# Patient Record
Sex: FEMALE | Race: BLACK OR AFRICAN AMERICAN | Employment: FULL TIME | URBAN - METROPOLITAN AREA
[De-identification: names, ages, dates, MRNs, and addresses within clinical notes are randomized per-mention and may not be internally consistent; named-entity substitution may affect disease eponyms.]

---

## 2017-01-12 RX ORDER — HYDROCHLOROTHIAZIDE 25 MG/1
TABLET ORAL
Qty: 14 TABLET | Refills: 0 | Status: SHIPPED | OUTPATIENT
Start: 2017-01-12 | End: 2017-01-17

## 2017-01-12 RX ORDER — METOPROLOL TARTRATE 50 MG/1
TABLET, FILM COATED ORAL
Qty: 28 TABLET | Refills: 0 | Status: SHIPPED | OUTPATIENT
Start: 2017-01-12 | End: 2017-01-17

## 2017-01-13 NOTE — TELEPHONE ENCOUNTER
Pt contacted. Informed MD approved 2 week supply until f/u appt is made. Appt scheduled at this time. No further actions required.

## 2017-01-17 ENCOUNTER — LAB ENCOUNTER (OUTPATIENT)
Dept: LAB | Age: 37
End: 2017-01-17
Attending: FAMILY MEDICINE
Payer: COMMERCIAL

## 2017-01-17 ENCOUNTER — OFFICE VISIT (OUTPATIENT)
Dept: FAMILY MEDICINE CLINIC | Facility: CLINIC | Age: 37
End: 2017-01-17

## 2017-01-17 VITALS
SYSTOLIC BLOOD PRESSURE: 136 MMHG | BODY MASS INDEX: 30 KG/M2 | WEIGHT: 173.63 LBS | HEART RATE: 78 BPM | DIASTOLIC BLOOD PRESSURE: 92 MMHG

## 2017-01-17 DIAGNOSIS — N94.6 MENSTRUAL CRAMPS: ICD-10-CM

## 2017-01-17 DIAGNOSIS — I10 ESSENTIAL HYPERTENSION: ICD-10-CM

## 2017-01-17 DIAGNOSIS — I10 ESSENTIAL HYPERTENSION: Primary | ICD-10-CM

## 2017-01-17 LAB
ALBUMIN SERPL BCP-MCNC: 3.7 G/DL (ref 3.5–4.8)
ALBUMIN/GLOB SERPL: 1 {RATIO} (ref 1–2)
ALP SERPL-CCNC: 50 U/L (ref 32–100)
ALT SERPL-CCNC: 11 U/L (ref 14–54)
ANION GAP SERPL CALC-SCNC: 9 MMOL/L (ref 0–18)
AST SERPL-CCNC: 21 U/L (ref 15–41)
BASOPHILS # BLD: 0.1 K/UL (ref 0–0.2)
BASOPHILS NFR BLD: 2 %
BILIRUB SERPL-MCNC: 0.5 MG/DL (ref 0.3–1.2)
BUN SERPL-MCNC: 8 MG/DL (ref 8–20)
BUN/CREAT SERPL: 8.8 (ref 10–20)
CALCIUM SERPL-MCNC: 8.7 MG/DL (ref 8.5–10.5)
CHLORIDE SERPL-SCNC: 103 MMOL/L (ref 95–110)
CHOLEST SERPL-MCNC: 149 MG/DL (ref 110–200)
CO2 SERPL-SCNC: 30 MMOL/L (ref 22–32)
CREAT SERPL-MCNC: 0.91 MG/DL (ref 0.5–1.5)
EOSINOPHIL # BLD: 0.1 K/UL (ref 0–0.7)
EOSINOPHIL NFR BLD: 1 %
ERYTHROCYTE [DISTWIDTH] IN BLOOD BY AUTOMATED COUNT: 17.8 % (ref 11–15)
GLOBULIN PLAS-MCNC: 3.7 G/DL (ref 2.5–3.7)
GLUCOSE SERPL-MCNC: 87 MG/DL (ref 70–99)
HBA1C MFR BLD: 5.6 % (ref 4–6)
HCT VFR BLD AUTO: 36.9 % (ref 35–48)
HDLC SERPL-MCNC: 41 MG/DL
HGB BLD-MCNC: 11.6 G/DL (ref 12–16)
LDLC SERPL CALC-MCNC: 96 MG/DL (ref 0–99)
LYMPHOCYTES # BLD: 2.5 K/UL (ref 1–4)
LYMPHOCYTES NFR BLD: 43 %
MCH RBC QN AUTO: 21 PG (ref 27–32)
MCHC RBC AUTO-ENTMCNC: 31.5 G/DL (ref 32–37)
MCV RBC AUTO: 66.6 FL (ref 80–100)
MONOCYTES # BLD: 0.3 K/UL (ref 0–1)
MONOCYTES NFR BLD: 6 %
NEUTROPHILS # BLD AUTO: 2.7 K/UL (ref 1.8–7.7)
NEUTROPHILS NFR BLD: 48 %
NONHDLC SERPL-MCNC: 108 MG/DL
OSMOLALITY UR CALC.SUM OF ELEC: 292 MOSM/KG (ref 275–295)
PLATELET # BLD AUTO: 271 K/UL (ref 140–400)
PMV BLD AUTO: 10 FL (ref 7.4–10.3)
POTASSIUM SERPL-SCNC: 3.3 MMOL/L (ref 3.3–5.1)
PROT SERPL-MCNC: 7.4 G/DL (ref 5.9–8.4)
RBC # BLD AUTO: 5.54 M/UL (ref 3.7–5.4)
SODIUM SERPL-SCNC: 142 MMOL/L (ref 136–144)
TRIGL SERPL-MCNC: 58 MG/DL (ref 1–149)
TSH SERPL-ACNC: 1.01 UIU/ML (ref 0.34–5.6)
WBC # BLD AUTO: 5.7 K/UL (ref 4–11)

## 2017-01-17 PROCEDURE — 83036 HEMOGLOBIN GLYCOSYLATED A1C: CPT

## 2017-01-17 PROCEDURE — 99212 OFFICE O/P EST SF 10 MIN: CPT | Performed by: FAMILY MEDICINE

## 2017-01-17 PROCEDURE — 36415 COLL VENOUS BLD VENIPUNCTURE: CPT

## 2017-01-17 PROCEDURE — 84443 ASSAY THYROID STIM HORMONE: CPT

## 2017-01-17 PROCEDURE — 85025 COMPLETE CBC W/AUTO DIFF WBC: CPT

## 2017-01-17 PROCEDURE — 80061 LIPID PANEL: CPT

## 2017-01-17 PROCEDURE — 99214 OFFICE O/P EST MOD 30 MIN: CPT | Performed by: FAMILY MEDICINE

## 2017-01-17 PROCEDURE — 80053 COMPREHEN METABOLIC PANEL: CPT

## 2017-01-17 RX ORDER — METOPROLOL TARTRATE 50 MG/1
50 TABLET, FILM COATED ORAL 2 TIMES DAILY
Qty: 180 TABLET | Refills: 1 | Status: SHIPPED | OUTPATIENT
Start: 2017-01-17 | End: 2017-01-31 | Stop reason: ALTCHOICE

## 2017-01-17 RX ORDER — HYDROCHLOROTHIAZIDE 25 MG/1
25 TABLET ORAL
Qty: 90 TABLET | Refills: 1 | Status: SHIPPED | OUTPATIENT
Start: 2017-01-17 | End: 2017-03-22 | Stop reason: ALTCHOICE

## 2017-01-17 RX ORDER — NAPROXEN 500 MG/1
500 TABLET ORAL 2 TIMES DAILY WITH MEALS
Qty: 60 TABLET | Refills: 0 | Status: SHIPPED | OUTPATIENT
Start: 2017-01-17 | End: 2017-02-16

## 2017-01-17 NOTE — PROGRESS NOTES
HPI:    Shan Moore is a 39year old female presents to clinic for follow up, has not been seen in clinic in over 2 years. Has HTN. Currently taking HCTZ and Metoprolol. Reports compliance with meds.  Patient denies HAs, blurry vision, nausea, vom Outpatient Prescriptions:  hydrochlorothiazide 25 MG Oral Tab Take 1 tablet (25 mg total) by mouth once daily. Disp: 90 tablet Rfl: 1   Metoprolol Tartrate 50 MG Oral Tab Take 1 tablet (50 mg total) by mouth 2 (two) times daily.  Disp: 180 tablet Rfl: 1   n encounter diagnosis)  - meds refilled  - CBC, CMP, TSH, Lipid Panel, and A1C to lab  - Advised lifestyle modifications including 30-40 minutes of cardio exercise 4-5 times a week - apart from walking to work, low salt/low fat, low carb diet, and weight los

## 2017-01-25 ENCOUNTER — TELEPHONE (OUTPATIENT)
Dept: FAMILY MEDICINE CLINIC | Facility: CLINIC | Age: 37
End: 2017-01-25

## 2017-01-25 DIAGNOSIS — D64.9 LOW HEMOGLOBIN: Primary | ICD-10-CM

## 2017-01-25 NOTE — TELEPHONE ENCOUNTER
Spoke to patient today regarding results. Normal CMP, TSH, Lipid Panel, and A1C. CBC shows low hgb with low HCT. Iron profile and Vitamin B12 orders placed . Will follow up results.

## 2017-01-31 ENCOUNTER — OFFICE VISIT (OUTPATIENT)
Dept: FAMILY MEDICINE CLINIC | Facility: CLINIC | Age: 37
End: 2017-01-31

## 2017-01-31 ENCOUNTER — APPOINTMENT (OUTPATIENT)
Dept: LAB | Age: 37
End: 2017-01-31
Attending: FAMILY MEDICINE
Payer: COMMERCIAL

## 2017-01-31 VITALS
TEMPERATURE: 99 F | DIASTOLIC BLOOD PRESSURE: 133 MMHG | HEART RATE: 72 BPM | WEIGHT: 174.38 LBS | BODY MASS INDEX: 30 KG/M2 | SYSTOLIC BLOOD PRESSURE: 189 MMHG

## 2017-01-31 DIAGNOSIS — I10 ESSENTIAL HYPERTENSION: ICD-10-CM

## 2017-01-31 DIAGNOSIS — D64.9 LOW HEMOGLOBIN: ICD-10-CM

## 2017-01-31 DIAGNOSIS — N94.6 MENSTRUAL CRAMPS: Primary | ICD-10-CM

## 2017-01-31 LAB
FERRITIN SERPL IA-MCNC: 19 NG/ML (ref 11–307)
IRON SATN MFR SERPL: 20 % (ref 15–50)
IRON SERPL-MCNC: 83 MCG/DL (ref 28–170)
TIBC SERPL-MCNC: 416 MCG/DL (ref 228–428)
TRANSFERRIN SERPL-MCNC: 315 MG/DL (ref 192–382)
VIT B12 SERPL-MCNC: 229 PG/ML (ref 181–914)

## 2017-01-31 PROCEDURE — 36415 COLL VENOUS BLD VENIPUNCTURE: CPT

## 2017-01-31 PROCEDURE — 99212 OFFICE O/P EST SF 10 MIN: CPT | Performed by: FAMILY MEDICINE

## 2017-01-31 PROCEDURE — 83540 ASSAY OF IRON: CPT

## 2017-01-31 PROCEDURE — 99214 OFFICE O/P EST MOD 30 MIN: CPT | Performed by: FAMILY MEDICINE

## 2017-01-31 PROCEDURE — 82607 VITAMIN B-12: CPT

## 2017-01-31 PROCEDURE — 84466 ASSAY OF TRANSFERRIN: CPT

## 2017-01-31 PROCEDURE — 82728 ASSAY OF FERRITIN: CPT

## 2017-01-31 RX ORDER — METOPROLOL SUCCINATE 50 MG/1
50 TABLET, EXTENDED RELEASE ORAL DAILY
Qty: 30 TABLET | Refills: 1 | Status: SHIPPED | OUTPATIENT
Start: 2017-01-31 | End: 2017-04-11

## 2017-01-31 NOTE — PROGRESS NOTES
HPI:    Suzi Benites is a 39year old female presents to clinic with concerns of menstrual cramps. Patient states that per period started yesterday and cramps have been very severe.  Says that she did try taking Naproxen 500 mg BID 2-3 days before cy Oral Tab Take 1 tablet (25 mg total) by mouth once daily. Disp: 90 tablet Rfl: 1   naproxen 500 MG Oral Tab Take 1 tablet (500 mg total) by mouth 2 (two) times daily with meals. Disp: 60 tablet Rfl: 0   IBUPROFEN & CAFFEINE-VITAMINS OR Take  by mouth.  Disp rechecked manually, 170/90 mmHg, patient asymptomatic  - Metoprolol Tartrate stopped and started Metoprolol Succinate ER 50 mg daily  - to continue HCTZ 25 mg   - to follow up in 2 weeks or sooner PRN       Orders This Visit:  No orders of the defined type

## 2017-03-22 ENCOUNTER — OFFICE VISIT (OUTPATIENT)
Dept: FAMILY MEDICINE CLINIC | Facility: CLINIC | Age: 37
End: 2017-03-22

## 2017-03-22 VITALS
DIASTOLIC BLOOD PRESSURE: 113 MMHG | SYSTOLIC BLOOD PRESSURE: 169 MMHG | HEART RATE: 89 BPM | WEIGHT: 178.19 LBS | BODY MASS INDEX: 31 KG/M2

## 2017-03-22 DIAGNOSIS — I10 UNCONTROLLED HYPERTENSION: ICD-10-CM

## 2017-03-22 DIAGNOSIS — Z30.09 GENERAL COUNSELING AND ADVICE FOR CONTRACEPTIVE MANAGEMENT: ICD-10-CM

## 2017-03-22 DIAGNOSIS — N92.0 MENORRHAGIA WITH REGULAR CYCLE: Primary | ICD-10-CM

## 2017-03-22 PROCEDURE — 93005 ELECTROCARDIOGRAM TRACING: CPT | Performed by: FAMILY MEDICINE

## 2017-03-22 PROCEDURE — 99212 OFFICE O/P EST SF 10 MIN: CPT | Performed by: FAMILY MEDICINE

## 2017-03-22 PROCEDURE — 99214 OFFICE O/P EST MOD 30 MIN: CPT | Performed by: FAMILY MEDICINE

## 2017-03-22 PROCEDURE — 93010 ELECTROCARDIOGRAM REPORT: CPT | Performed by: FAMILY MEDICINE

## 2017-03-22 RX ORDER — LISINOPRIL AND HYDROCHLOROTHIAZIDE 25; 20 MG/1; MG/1
1 TABLET ORAL DAILY
Qty: 30 TABLET | Refills: 1 | Status: SHIPPED | OUTPATIENT
Start: 2017-03-22 | End: 2017-05-30

## 2017-03-22 NOTE — PROGRESS NOTES
HPI:    Nai Lover is a 39year old female presents to clinic with questions regarding IUD insertion. States that she is tired of her heavy periods and crmaps.  She has tried to premedicate with Naproxen and this helps but not as much as she'd like Comment:Other reaction(s): Nausea  Amoxicillin                 Comment:Other reaction(s): Diarrhea  Potassium                   Comment:Other reaction(s): Diarrhea      ROS:   Review of Systems   Constitutional: Negative. HENT: Negative.     Eyes: Nhi Haddad 30 tablet 1      Sig: Take 1 tablet by mouth daily. Imaging & Referrals:  ELECTROCARDIOGRAM, COMPLETE     Patient verbalized understanding. No barriers to learning observed.    3/22/2017  Niko Garcia MD

## 2017-04-12 RX ORDER — METOPROLOL SUCCINATE 50 MG/1
TABLET, EXTENDED RELEASE ORAL
Qty: 30 TABLET | Refills: 2 | Status: SHIPPED | OUTPATIENT
Start: 2017-04-12 | End: 2017-07-08

## 2017-04-12 NOTE — TELEPHONE ENCOUNTER
METOPROLOL Refill protocol passed because the patient met the following protocol for ANTIHYPERTENSIVES Medication refilled for 90 days per protocol.   Hypertensive Medications  Protocol Criteria:  · Appointment scheduled in the past 6 months or in the next

## 2017-05-30 ENCOUNTER — HOSPITAL ENCOUNTER (EMERGENCY)
Facility: HOSPITAL | Age: 37
Discharge: HOME | End: 2017-05-30
Attending: EMERGENCY MEDICINE
Payer: COMMERCIAL

## 2017-05-30 ENCOUNTER — APPOINTMENT (OUTPATIENT)
Dept: ULTRASOUND IMAGING | Facility: HOSPITAL | Age: 37
End: 2017-05-30
Attending: EMERGENCY MEDICINE
Payer: COMMERCIAL

## 2017-05-30 ENCOUNTER — TELEPHONE (OUTPATIENT)
Dept: FAMILY MEDICINE CLINIC | Facility: CLINIC | Age: 37
End: 2017-05-30

## 2017-05-30 VITALS
TEMPERATURE: 98 F | HEIGHT: 65 IN | DIASTOLIC BLOOD PRESSURE: 88 MMHG | HEART RATE: 62 BPM | WEIGHT: 165 LBS | BODY MASS INDEX: 27.49 KG/M2 | SYSTOLIC BLOOD PRESSURE: 128 MMHG | OXYGEN SATURATION: 99 % | RESPIRATION RATE: 18 BRPM

## 2017-05-30 DIAGNOSIS — R10.9 ABDOMINAL PAIN, ACUTE: Primary | ICD-10-CM

## 2017-05-30 DIAGNOSIS — N94.6 DYSMENORRHEA: ICD-10-CM

## 2017-05-30 PROCEDURE — 81001 URINALYSIS AUTO W/SCOPE: CPT | Performed by: EMERGENCY MEDICINE

## 2017-05-30 PROCEDURE — 80048 BASIC METABOLIC PNL TOTAL CA: CPT | Performed by: EMERGENCY MEDICINE

## 2017-05-30 PROCEDURE — 87086 URINE CULTURE/COLONY COUNT: CPT | Performed by: EMERGENCY MEDICINE

## 2017-05-30 PROCEDURE — 87106 FUNGI IDENTIFICATION YEAST: CPT | Performed by: EMERGENCY MEDICINE

## 2017-05-30 PROCEDURE — 87591 N.GONORRHOEAE DNA AMP PROB: CPT | Performed by: EMERGENCY MEDICINE

## 2017-05-30 PROCEDURE — 76856 US EXAM PELVIC COMPLETE: CPT | Performed by: EMERGENCY MEDICINE

## 2017-05-30 PROCEDURE — 81025 URINE PREGNANCY TEST: CPT

## 2017-05-30 PROCEDURE — 96361 HYDRATE IV INFUSION ADD-ON: CPT

## 2017-05-30 PROCEDURE — 96375 TX/PRO/DX INJ NEW DRUG ADDON: CPT

## 2017-05-30 PROCEDURE — 85025 COMPLETE CBC W/AUTO DIFF WBC: CPT | Performed by: EMERGENCY MEDICINE

## 2017-05-30 PROCEDURE — 93975 VASCULAR STUDY: CPT | Performed by: EMERGENCY MEDICINE

## 2017-05-30 PROCEDURE — 96374 THER/PROPH/DIAG INJ IV PUSH: CPT

## 2017-05-30 PROCEDURE — 87491 CHLMYD TRACH DNA AMP PROBE: CPT | Performed by: EMERGENCY MEDICINE

## 2017-05-30 PROCEDURE — 85060 BLOOD SMEAR INTERPRETATION: CPT | Performed by: EMERGENCY MEDICINE

## 2017-05-30 PROCEDURE — 87808 TRICHOMONAS ASSAY W/OPTIC: CPT | Performed by: EMERGENCY MEDICINE

## 2017-05-30 PROCEDURE — 87205 SMEAR GRAM STAIN: CPT | Performed by: EMERGENCY MEDICINE

## 2017-05-30 PROCEDURE — 76830 TRANSVAGINAL US NON-OB: CPT | Performed by: EMERGENCY MEDICINE

## 2017-05-30 PROCEDURE — 99284 EMERGENCY DEPT VISIT MOD MDM: CPT

## 2017-05-30 RX ORDER — ONDANSETRON 2 MG/ML
4 INJECTION INTRAMUSCULAR; INTRAVENOUS ONCE
Status: COMPLETED | OUTPATIENT
Start: 2017-05-30 | End: 2017-05-30

## 2017-05-30 RX ORDER — HYDROCODONE BITARTRATE AND ACETAMINOPHEN 5; 325 MG/1; MG/1
1-2 TABLET ORAL EVERY 4 HOURS PRN
Qty: 15 TABLET | Refills: 0 | Status: SHIPPED | OUTPATIENT
Start: 2017-05-30 | End: 2017-06-01

## 2017-05-30 RX ORDER — HYDROMORPHONE HYDROCHLORIDE 1 MG/ML
0.5 INJECTION, SOLUTION INTRAMUSCULAR; INTRAVENOUS; SUBCUTANEOUS ONCE
Status: COMPLETED | OUTPATIENT
Start: 2017-05-30 | End: 2017-05-30

## 2017-05-30 RX ORDER — SODIUM CHLORIDE 9 MG/ML
INJECTION, SOLUTION INTRAVENOUS ONCE
Status: COMPLETED | OUTPATIENT
Start: 2017-05-30 | End: 2017-05-30

## 2017-05-30 RX ORDER — NITROFURANTOIN MACROCRYSTALS 100 MG/1
100 CAPSULE ORAL EVERY 12 HOURS
Qty: 10 CAPSULE | Refills: 0 | Status: ON HOLD | OUTPATIENT
Start: 2017-05-30 | End: 2017-08-19

## 2017-05-30 NOTE — TELEPHONE ENCOUNTER
Dr Lucy Walton, patient is hoping to get IUD inserted within the next few days. She went to ER today, pain 9/10, dx with dysmenorrhea, had US and negative pregnancy test. Received IV morphine and zofran.  Discharged with rx for hydrocodone  She is on day 2 of

## 2017-05-30 NOTE — TELEPHONE ENCOUNTER
Pt stts she was seen in the er today 5/30 about cramping   Pt stts she needs to have an IUD inserted and already had the consult. Pt has some more questions about the IUD. Before making the appt.   Please advise

## 2017-05-30 NOTE — ED NOTES
Pt discharged home in good condition with family, pt denies any pain no vomiting pt verbalizes understanding of discharge instructions

## 2017-05-30 NOTE — ED NOTES
Pt back from [address], pelvic exam done by Dr Damaris Frazier pt has not had any vomiting since arrival

## 2017-05-30 NOTE — ED NOTES
Pt states abd pain is level 9 per pain scale, IV .9ns one liter bolus started labs drawn and sent pregnancy test negative medications given as ordered, urine obtained and sent

## 2017-05-30 NOTE — ED PROVIDER NOTES
Patient Seen in: [facility] Emergency Department    History   Patient presents with:  Nausea/Vomiting/Diarrhea (gastrointestinal)    Stated Complaint: N/V/    HPI    Patient presents with lower abdominal pain.   She reports that she has had this ty Hypertension Father    • Hypertension Mother    • Genito-Urinary Disorder Mother      fibroids   • Diabetes Maternal Grandmother          Smoking Status: Former Smoker                   Packs/Day: 1.00  Years:         Smokeless Status: Former User Zofran IV fluids check blood tests urinalysis urine pregnancy    The urinalysis showed white blood cells but no bacteria.   Culture is pending I recommend covering with antibiotics being the result we did do vaginal exam which was normal except for some blo Clinical Impression:  Abdominal pain, acute  (primary encounter diagnosis)  Dysmenorrhea    Disposition:  Discharge    [facility] [phone]    In 2 days  For re-check    Evelio Cassidy

## 2017-05-31 RX ORDER — LISINOPRIL AND HYDROCHLOROTHIAZIDE 25; 20 MG/1; MG/1
TABLET ORAL
Qty: 30 TABLET | Refills: 1 | Status: SHIPPED | OUTPATIENT
Start: 2017-05-31 | End: 2017-07-08

## 2017-06-01 ENCOUNTER — OFFICE VISIT (OUTPATIENT)
Dept: FAMILY MEDICINE CLINIC | Facility: CLINIC | Age: 37
End: 2017-06-01

## 2017-06-01 VITALS — SYSTOLIC BLOOD PRESSURE: 148 MMHG | HEART RATE: 71 BPM | DIASTOLIC BLOOD PRESSURE: 99 MMHG | TEMPERATURE: 98 F

## 2017-06-01 DIAGNOSIS — Z30.430 ENCOUNTER FOR INSERTION OF INTRAUTERINE CONTRACEPTIVE DEVICE: Primary | ICD-10-CM

## 2017-06-01 DIAGNOSIS — H93.8X3 CLOGGED EAR, BILATERAL: ICD-10-CM

## 2017-06-01 PROCEDURE — 58300 INSERT INTRAUTERINE DEVICE: CPT | Performed by: FAMILY MEDICINE

## 2017-06-01 PROCEDURE — 69210 REMOVE IMPACTED EAR WAX UNI: CPT | Performed by: FAMILY MEDICINE

## 2017-06-01 PROCEDURE — 99213 OFFICE O/P EST LOW 20 MIN: CPT | Performed by: FAMILY MEDICINE

## 2017-06-01 NOTE — PROGRESS NOTES
Maya Hernadez is a 40year old female presents to clinic for IUD insert. States that she has very heavy cycles with severe cramping. Nulliparous. Last Pap - normal   Also, patient states that she cannot hear properly from both of her ears.  This has (36.9 °C)   TempSrc: Oral     Physical Exam   Constitutional: She is well-developed, well-nourished, and in no distress. HENT:   Head: Normocephalic and atraumatic. Unable to visualize TMs bilaterally 2/2 cerumen impaction   Neurological: She is alert.

## 2017-07-08 ENCOUNTER — OFFICE VISIT (OUTPATIENT)
Dept: FAMILY MEDICINE CLINIC | Facility: CLINIC | Age: 37
End: 2017-07-08

## 2017-07-08 ENCOUNTER — HOSPITAL ENCOUNTER (OUTPATIENT)
Dept: GENERAL RADIOLOGY | Age: 37
Discharge: HOME | End: 2017-07-08
Attending: FAMILY MEDICINE
Payer: COMMERCIAL

## 2017-07-08 VITALS
SYSTOLIC BLOOD PRESSURE: 128 MMHG | DIASTOLIC BLOOD PRESSURE: 82 MMHG | WEIGHT: 175 LBS | RESPIRATION RATE: 16 BRPM | TEMPERATURE: 98 F | BODY MASS INDEX: 29 KG/M2 | HEART RATE: 50 BPM

## 2017-07-08 DIAGNOSIS — I10 ESSENTIAL HYPERTENSION: ICD-10-CM

## 2017-07-08 DIAGNOSIS — Z30.431 IUD CHECK UP: Primary | ICD-10-CM

## 2017-07-08 DIAGNOSIS — Z30.431 IUD CHECK UP: ICD-10-CM

## 2017-07-08 PROCEDURE — 99212 OFFICE O/P EST SF 10 MIN: CPT | Performed by: FAMILY MEDICINE

## 2017-07-08 PROCEDURE — 99214 OFFICE O/P EST MOD 30 MIN: CPT | Performed by: FAMILY MEDICINE

## 2017-07-08 PROCEDURE — 72170 X-RAY EXAM OF PELVIS: CPT | Performed by: FAMILY MEDICINE

## 2017-07-08 RX ORDER — METOPROLOL SUCCINATE 50 MG/1
TABLET, EXTENDED RELEASE ORAL
Qty: 90 TABLET | Refills: 1 | Status: SHIPPED | OUTPATIENT
Start: 2017-07-08 | End: 2018-02-15

## 2017-07-08 RX ORDER — LISINOPRIL AND HYDROCHLOROTHIAZIDE 25; 20 MG/1; MG/1
1 TABLET ORAL
Qty: 90 TABLET | Refills: 1 | Status: SHIPPED | OUTPATIENT
Start: 2017-07-08 | End: 2018-02-15

## 2017-07-10 ENCOUNTER — TELEPHONE (OUTPATIENT)
Dept: FAMILY MEDICINE CLINIC | Facility: CLINIC | Age: 37
End: 2017-07-10

## 2017-07-10 NOTE — TELEPHONE ENCOUNTER
Pt calling regarding her results of X-ray done at the [address] [place] [address]. Danielle Jones please advise

## 2017-07-11 NOTE — TELEPHONE ENCOUNTER
Please inform patient that IUD is in the uterus, if spotting doesn't stop in the next 6-8 weeks, to let me know

## 2017-07-12 NOTE — TELEPHONE ENCOUNTER
Pt returned call, verified name and . Reviewed Xray results and recommendations with pt per doctor's instructions. Pt agreed with plan of care and had no further questions at this time.

## 2017-08-09 ENCOUNTER — TELEPHONE (OUTPATIENT)
Dept: ADMINISTRATIVE | Age: 37
End: 2017-08-09

## 2017-08-09 NOTE — TELEPHONE ENCOUNTER
Good afternoon Dr. Zuleta Blue form pending in POONAM. Pt is requesting 1-4 days a month of intermittent time with episodes lasting 1-48 hours for her bad periods. Do you approve? Please advise.     Thank you,  Higinio Rosario

## 2017-08-18 ENCOUNTER — HOSPITAL ENCOUNTER (INPATIENT)
Facility: HOSPITAL | Age: 37
LOS: 1 days | Discharge: HOME | DRG: 310 | End: 2017-08-20
Attending: EMERGENCY MEDICINE | Admitting: HOSPITALIST
Payer: COMMERCIAL

## 2017-08-18 DIAGNOSIS — K52.9 GASTROENTERITIS: Primary | ICD-10-CM

## 2017-08-18 DIAGNOSIS — I48.91 ATRIAL FIBRILLATION WITH RAPID VENTRICULAR RESPONSE (HCC): ICD-10-CM

## 2017-08-18 DIAGNOSIS — R77.8 ELEVATED TROPONIN: ICD-10-CM

## 2017-08-19 ENCOUNTER — APPOINTMENT (OUTPATIENT)
Dept: NUCLEAR MEDICINE | Facility: HOSPITAL | Age: 37
DRG: 310 | End: 2017-08-19
Attending: HOSPITALIST
Payer: COMMERCIAL

## 2017-08-19 ENCOUNTER — APPOINTMENT (OUTPATIENT)
Dept: CT IMAGING | Facility: HOSPITAL | Age: 37
DRG: 310 | End: 2017-08-19
Attending: EMERGENCY MEDICINE
Payer: COMMERCIAL

## 2017-08-19 ENCOUNTER — APPOINTMENT (OUTPATIENT)
Dept: CV DIAGNOSTICS | Facility: HOSPITAL | Age: 37
DRG: 310 | End: 2017-08-19
Attending: HOSPITALIST
Payer: COMMERCIAL

## 2017-08-19 PROBLEM — I48.91 ATRIAL FIBRILLATION WITH RAPID VENTRICULAR RESPONSE (HCC): Status: ACTIVE | Noted: 2017-08-19

## 2017-08-19 PROBLEM — K52.9 GASTROENTERITIS: Status: ACTIVE | Noted: 2017-08-19

## 2017-08-19 LAB
ALBUMIN SERPL BCP-MCNC: 4.2 G/DL (ref 3.5–4.8)
ALBUMIN/GLOB SERPL: 1.1 {RATIO} (ref 1–2)
ALP SERPL-CCNC: 57 U/L (ref 32–100)
ALT SERPL-CCNC: 16 U/L (ref 14–54)
ANION GAP SERPL CALC-SCNC: 10 MMOL/L (ref 0–18)
APTT PPP: 27.8 SECONDS (ref 23.2–35.3)
APTT PPP: 70.7 SECONDS (ref 23.2–35.3)
AST SERPL-CCNC: 29 U/L (ref 15–41)
B-HCG UR QL: NEGATIVE
BASOPHILS # BLD: 0.1 K/UL (ref 0–0.2)
BASOPHILS NFR BLD: 1 %
BILIRUB SERPL-MCNC: 1 MG/DL (ref 0.3–1.2)
BILIRUB UR QL: NEGATIVE
BUN SERPL-MCNC: 17 MG/DL (ref 8–20)
BUN/CREAT SERPL: 14.9 (ref 10–20)
CALCIUM SERPL-MCNC: 9 MG/DL (ref 8.5–10.5)
CHLORIDE SERPL-SCNC: 105 MMOL/L (ref 95–110)
CHOLEST SERPL-MCNC: 152 MG/DL (ref 110–200)
CO2 SERPL-SCNC: 24 MMOL/L (ref 22–32)
COLOR UR: YELLOW
CREAT SERPL-MCNC: 1.14 MG/DL (ref 0.5–1.5)
D DIMER PPP FEU-MCNC: 1.76 MCG/ML (ref ?–0.5)
EOSINOPHIL # BLD: 0 K/UL (ref 0–0.7)
EOSINOPHIL NFR BLD: 0 %
ERYTHROCYTE [DISTWIDTH] IN BLOOD BY AUTOMATED COUNT: 15.5 % (ref 11–15)
GLOBULIN PLAS-MCNC: 4 G/DL (ref 2.5–3.7)
GLUCOSE SERPL-MCNC: 129 MG/DL (ref 70–99)
GLUCOSE UR-MCNC: NEGATIVE MG/DL
HCT VFR BLD AUTO: 41.2 % (ref 35–48)
HDLC SERPL-MCNC: 41 MG/DL
HGB BLD-MCNC: 13.2 G/DL (ref 12–16)
KETONES UR-MCNC: 20 MG/DL
LDLC SERPL CALC-MCNC: 98 MG/DL (ref 0–99)
LEUKOCYTE ESTERASE UR QL STRIP.AUTO: NEGATIVE
LIPASE SERPL-CCNC: 14 U/L (ref 22–51)
LYMPHOCYTES # BLD: 1.3 K/UL (ref 1–4)
LYMPHOCYTES NFR BLD: 10 %
MAGNESIUM SERPL-MCNC: 1.9 MG/DL (ref 1.8–2.5)
MCH RBC QN AUTO: 22.5 PG (ref 27–32)
MCHC RBC AUTO-ENTMCNC: 32 G/DL (ref 32–37)
MCV RBC AUTO: 70.3 FL (ref 80–100)
MONOCYTES # BLD: 0.2 K/UL (ref 0–1)
MONOCYTES NFR BLD: 2 %
NEUTROPHILS # BLD AUTO: 11.1 K/UL (ref 1.8–7.7)
NEUTROPHILS NFR BLD: 88 %
NITRITE UR QL STRIP.AUTO: NEGATIVE
NONHDLC SERPL-MCNC: 111 MG/DL
OSMOLALITY UR CALC.SUM OF ELEC: 291 MOSM/KG (ref 275–295)
PH UR: 5 [PH] (ref 5–8)
PLATELET # BLD AUTO: 263 K/UL (ref 140–400)
PMV BLD AUTO: 9.5 FL (ref 7.4–10.3)
POTASSIUM SERPL-SCNC: 3.3 MMOL/L (ref 3.3–5.1)
PROT SERPL-MCNC: 8.2 G/DL (ref 5.9–8.4)
PROT UR-MCNC: 100 MG/DL
RBC # BLD AUTO: 5.86 M/UL (ref 3.7–5.4)
RBC #/AREA URNS AUTO: 4 /HPF
SODIUM SERPL-SCNC: 139 MMOL/L (ref 136–144)
SP GR UR STRIP: 1.03 (ref 1–1.03)
TRIGL SERPL-MCNC: 65 MG/DL (ref 1–149)
TROPONIN I SERPL-MCNC: 0.53 NG/ML (ref ?–0.03)
TROPONIN I SERPL-MCNC: 0.54 NG/ML (ref ?–0.03)
TROPONIN I SERPL-MCNC: 0.6 NG/ML (ref ?–0.03)
TSH SERPL-ACNC: 0.71 UIU/ML (ref 0.45–5.33)
UROBILINOGEN UR STRIP-ACNC: <2
VIT C UR-MCNC: 40 MG/DL
WBC # BLD AUTO: 12.7 K/UL (ref 4–11)
WBC #/AREA URNS AUTO: 2 /HPF

## 2017-08-19 PROCEDURE — 99223 1ST HOSP IP/OBS HIGH 75: CPT | Performed by: HOSPITALIST

## 2017-08-19 PROCEDURE — 93306 TTE W/DOPPLER COMPLETE: CPT | Performed by: HOSPITALIST

## 2017-08-19 PROCEDURE — 78582 LUNG VENTILAT&PERFUS IMAGING: CPT | Performed by: HOSPITALIST

## 2017-08-19 PROCEDURE — 74177 CT ABD & PELVIS W/CONTRAST: CPT | Performed by: EMERGENCY MEDICINE

## 2017-08-19 RX ORDER — FAMOTIDINE 10 MG/ML
20 INJECTION, SOLUTION INTRAVENOUS ONCE
Status: COMPLETED | OUTPATIENT
Start: 2017-08-19 | End: 2017-08-19

## 2017-08-19 RX ORDER — HEPARIN SODIUM AND DEXTROSE 10000; 5 [USP'U]/100ML; G/100ML
INJECTION INTRAVENOUS CONTINUOUS
Status: DISCONTINUED | OUTPATIENT
Start: 2017-08-19 | End: 2017-08-20

## 2017-08-19 RX ORDER — ASPIRIN 81 MG/1
TABLET, CHEWABLE ORAL
Status: COMPLETED
Start: 2017-08-19 | End: 2017-08-19

## 2017-08-19 RX ORDER — ACETAMINOPHEN 325 MG/1
650 TABLET ORAL EVERY 6 HOURS PRN
Status: DISCONTINUED | OUTPATIENT
Start: 2017-08-19 | End: 2017-08-20

## 2017-08-19 RX ORDER — SODIUM CHLORIDE AND POTASSIUM CHLORIDE .9; .15 G/100ML; G/100ML
SOLUTION INTRAVENOUS CONTINUOUS
Status: DISCONTINUED | OUTPATIENT
Start: 2017-08-19 | End: 2017-08-20

## 2017-08-19 RX ORDER — METOCLOPRAMIDE HYDROCHLORIDE 5 MG/ML
10 INJECTION INTRAMUSCULAR; INTRAVENOUS EVERY 6 HOURS PRN
Status: DISCONTINUED | OUTPATIENT
Start: 2017-08-19 | End: 2017-08-20

## 2017-08-19 RX ORDER — METOPROLOL SUCCINATE 50 MG/1
50 TABLET, EXTENDED RELEASE ORAL
Status: DISCONTINUED | OUTPATIENT
Start: 2017-08-19 | End: 2017-08-20

## 2017-08-19 RX ORDER — ASPIRIN 81 MG/1
324 TABLET, CHEWABLE ORAL ONCE
Status: COMPLETED | OUTPATIENT
Start: 2017-08-19 | End: 2017-08-19

## 2017-08-19 RX ORDER — ONDANSETRON 2 MG/ML
4 INJECTION INTRAMUSCULAR; INTRAVENOUS ONCE
Status: COMPLETED | OUTPATIENT
Start: 2017-08-19 | End: 2017-08-19

## 2017-08-19 RX ORDER — SODIUM CHLORIDE 9 MG/ML
INJECTION, SOLUTION INTRAVENOUS CONTINUOUS
Status: ACTIVE | OUTPATIENT
Start: 2017-08-19 | End: 2017-08-19

## 2017-08-19 RX ORDER — PANTOPRAZOLE SODIUM 40 MG/1
40 TABLET, DELAYED RELEASE ORAL
Status: DISCONTINUED | OUTPATIENT
Start: 2017-08-19 | End: 2017-08-20

## 2017-08-19 RX ORDER — MAGNESIUM HYDROXIDE/ALUMINUM HYDROXICE/SIMETHICONE 120; 1200; 1200 MG/30ML; MG/30ML; MG/30ML
30 SUSPENSION ORAL 4 TIMES DAILY PRN
Status: DISCONTINUED | OUTPATIENT
Start: 2017-08-19 | End: 2017-08-20

## 2017-08-19 RX ORDER — DILTIAZEM HYDROCHLORIDE 5 MG/ML
15 INJECTION INTRAVENOUS ONCE
Status: COMPLETED | OUTPATIENT
Start: 2017-08-19 | End: 2017-08-19

## 2017-08-19 RX ORDER — ONDANSETRON 2 MG/ML
4 INJECTION INTRAMUSCULAR; INTRAVENOUS EVERY 6 HOURS PRN
Status: DISCONTINUED | OUTPATIENT
Start: 2017-08-19 | End: 2017-08-20

## 2017-08-19 RX ORDER — HEPARIN SODIUM AND DEXTROSE 10000; 5 [USP'U]/100ML; G/100ML
12 INJECTION INTRAVENOUS ONCE
Status: COMPLETED | OUTPATIENT
Start: 2017-08-19 | End: 2017-08-19

## 2017-08-19 RX ORDER — MORPHINE SULFATE 2 MG/ML
2 INJECTION, SOLUTION INTRAMUSCULAR; INTRAVENOUS EVERY 2 HOUR PRN
Status: DISCONTINUED | OUTPATIENT
Start: 2017-08-19 | End: 2017-08-20

## 2017-08-19 RX ORDER — MORPHINE SULFATE 4 MG/ML
4 INJECTION, SOLUTION INTRAMUSCULAR; INTRAVENOUS EVERY 2 HOUR PRN
Status: DISCONTINUED | OUTPATIENT
Start: 2017-08-19 | End: 2017-08-20

## 2017-08-19 RX ORDER — HEPARIN SODIUM 1000 [USP'U]/ML
60 INJECTION, SOLUTION INTRAVENOUS; SUBCUTANEOUS ONCE
Status: COMPLETED | OUTPATIENT
Start: 2017-08-19 | End: 2017-08-19

## 2017-08-19 NOTE — ED PROVIDER NOTES
Patient Seen in: [facility] AND [facility] Emergency Department    History   Patient presents with:  Nausea/Vomiting/Diarrhea (gastrointestinal)    Stated Complaint:     HPI    39 y/o female w/ no abd surgeries and c/o diffuse abd pain, N/V/D, chills and decreas Triage Vitals  BP: (!) 138/108 [08/18/17 2354]  Pulse: 71 [08/18/17 2353]  Resp: 18 [08/18/17 2353]  Temp: 98.1 °F (36.7 °C) [08/18/17 2353]  Temp src: Temporal [08/18/17 2353]  SpO2: 99 % [08/18/17 2353]  O2 Device: None (Room air) [08/18/17 2353]    Curr within normal limits   TROPONIN I, 0 HOUR - Abnormal; Notable for the following:     Troponin 0.54 (*)     All other components within normal limits   TROPONIN I, 2 HOUR - Abnormal; Notable for the following:     Troponin 0.53 (*)     All other components Dr. Cesilia Hciks in the ED on 3:25 AM ET. Nirali Wiley M.D. This report has been electronically signed and verified by the Radiologist whose name is printed above.     DD:  08/19/2017/DT:  08/19/2017    MDM   She was some improvement in her symptoms here as

## 2017-08-19 NOTE — H&P
[address] Patient Status:  Inpatient    1980 MRN [id number]   Location [facility] 3W/SW Attending Yariel Kendrick MD   Hosp Day # 0 PCP Alberto Hudson MD     Date:  2017  Date has quit using smokeless tobacco. She reports that she drinks alcohol. She reports that she does not use drugs.     Allergies:    Amlodipine                  Comment:Other reaction(s): Nausea  Amoxicillin                 Comment:Other reaction(s): Diarrhea flank, non-distended, hyperactive bowel sounds, no organomegaly. Lymphatics:  No lymphadenopathy neck, axilla, groin. Musculoskeletal: Normal range of motion. normal strength. Feet:  Normal pulses.   Neurologic:  Alert, oriented, no focal deficits, cran well.    Prophylaxis  Subcutaneous heparin    CODE STATUS  Full    Primary care physician  Tucker Joyner MD    Disposition  Clinical course will dictate outcome      Nicolle Barnett MD  8/19/2017  5:04 AM

## 2017-08-19 NOTE — PROGRESS NOTES
30 min spent post mn  Troponin high no stress  Await echo ?  If needs cath  dw mwh   vq neg dw pt no alcohol fatty liver, iud. diverticulosis

## 2017-08-19 NOTE — CONSULTS
[facility]  Report of Consultation    Olivia Carreon Patient Status:  Inpatient    1980 MRN [id number]   Location [facility] 3W/SW Attending Estelle Dunn MD   Hosp Day # 0 PCP Ney Saunders MD     Reason for Consultation:   Af mg/hr, Intravenous, Continuous  •  0.9%  NaCl infusion, , Intravenous, Continuous  •  heparin (PORCINE) 07020fcpyv/250mL infusion ED INITIAL DOSE, 12 Units/kg/hr, Intravenous, Once  •  heparin (PORCINE) drip 76399edpwf/250mL infusion CONTINUOUS, 200-3,000 Results  Component Value Date   HGB 13.2 08/19/2017   HCT 41.2 08/19/2017    08/19/2017               Assessment/Plan:    1.  Afib - unknown duration; pt does not sense her afib; rates now controlled with CCB drip    -ECHO   -TSH   -CHADs score one:

## 2017-08-20 VITALS
OXYGEN SATURATION: 98 % | DIASTOLIC BLOOD PRESSURE: 88 MMHG | SYSTOLIC BLOOD PRESSURE: 137 MMHG | BODY MASS INDEX: 29.73 KG/M2 | HEIGHT: 65 IN | RESPIRATION RATE: 16 BRPM | WEIGHT: 178.44 LBS | TEMPERATURE: 98 F | HEART RATE: 78 BPM

## 2017-08-20 LAB
ANION GAP SERPL CALC-SCNC: 9 MMOL/L (ref 0–18)
APTT PPP: 31.2 SECONDS (ref 23.2–35.3)
APTT PPP: 49.9 SECONDS (ref 23.2–35.3)
BASOPHILS # BLD: 0.1 K/UL (ref 0–0.2)
BASOPHILS NFR BLD: 1 %
BUN SERPL-MCNC: 7 MG/DL (ref 8–20)
BUN/CREAT SERPL: 10.4 (ref 10–20)
CALCIUM SERPL-MCNC: 8.1 MG/DL (ref 8.5–10.5)
CHLORIDE SERPL-SCNC: 110 MMOL/L (ref 95–110)
CO2 SERPL-SCNC: 20 MMOL/L (ref 22–32)
CREAT SERPL-MCNC: 0.67 MG/DL (ref 0.5–1.5)
EOSINOPHIL # BLD: 0 K/UL (ref 0–0.7)
EOSINOPHIL NFR BLD: 0 %
ERYTHROCYTE [DISTWIDTH] IN BLOOD BY AUTOMATED COUNT: 15.7 % (ref 11–15)
GLUCOSE SERPL-MCNC: 94 MG/DL (ref 70–99)
HCT VFR BLD AUTO: 35.7 % (ref 35–48)
HGB BLD-MCNC: 11.5 G/DL (ref 12–16)
LYMPHOCYTES # BLD: 3.2 K/UL (ref 1–4)
LYMPHOCYTES NFR BLD: 36 %
MAGNESIUM SERPL-MCNC: 1.9 MG/DL (ref 1.8–2.5)
MCH RBC QN AUTO: 22.4 PG (ref 27–32)
MCHC RBC AUTO-ENTMCNC: 32.2 G/DL (ref 32–37)
MCV RBC AUTO: 69.5 FL (ref 80–100)
MONOCYTES # BLD: 0.5 K/UL (ref 0–1)
MONOCYTES NFR BLD: 6 %
NEUTROPHILS # BLD AUTO: 5.1 K/UL (ref 1.8–7.7)
NEUTROPHILS NFR BLD: 57 %
OSMOLALITY UR CALC.SUM OF ELEC: 286 MOSM/KG (ref 275–295)
PLATELET # BLD AUTO: 217 K/UL (ref 140–400)
PMV BLD AUTO: 9.2 FL (ref 7.4–10.3)
POTASSIUM SERPL-SCNC: 4.1 MMOL/L (ref 3.3–5.1)
POTASSIUM SERPL-SCNC: 4.1 MMOL/L (ref 3.3–5.1)
RBC # BLD AUTO: 5.14 M/UL (ref 3.7–5.4)
SODIUM SERPL-SCNC: 139 MMOL/L (ref 136–144)
WBC # BLD AUTO: 9 K/UL (ref 4–11)

## 2017-08-20 PROCEDURE — 99239 HOSP IP/OBS DSCHRG MGMT >30: CPT | Performed by: HOSPITALIST

## 2017-08-20 RX ORDER — HEPARIN SODIUM 1000 [USP'U]/ML
30 INJECTION, SOLUTION INTRAVENOUS; SUBCUTANEOUS ONCE
Status: COMPLETED | OUTPATIENT
Start: 2017-08-20 | End: 2017-08-20

## 2017-08-20 RX ORDER — 0.9 % SODIUM CHLORIDE 0.9 %
VIAL (ML) INJECTION
Status: COMPLETED
Start: 2017-08-20 | End: 2017-08-20

## 2017-08-20 RX ORDER — LISINOPRIL AND HYDROCHLOROTHIAZIDE 25; 20 MG/1; MG/1
1 TABLET ORAL
Status: DISCONTINUED | OUTPATIENT
Start: 2017-08-20 | End: 2017-08-20 | Stop reason: RX

## 2017-08-20 RX ORDER — ASPIRIN 325 MG
325 TABLET ORAL DAILY
Qty: 30 TABLET | Refills: 0 | Status: SHIPPED | OUTPATIENT
Start: 2017-08-20 | End: 2018-10-13

## 2017-08-20 NOTE — PLAN OF CARE
Patient ok for discharge home today per cardiology and primary care. Patient started on a daily aspirin. Patient instructed to follow-up with cardiology- call on Monday to make an appointment.

## 2017-08-20 NOTE — DISCHARGE SUMMARY
[facility]  Discharge Summary     [facility]  [facility] 1980    Status: Inpatient  Day #: 1    Attending: Yojana Cast MD  PCP: Isiah Amador MD     Date of Admission: 2017  Date of Discharge: 2017     Hospital Discharge Diag Quantity:  30 tablet  Refills:  0        CONTINUE taking these medications      Instructions Prescription details   Lisinopril-Hydrochlorothiazide 20-25 MG Tabs      Take 1 tablet by mouth once daily.    Quantity:  90 tablet  Refills:  1     Metoprolol Succ

## 2017-08-20 NOTE — PROGRESS NOTES
[facility]  Progress Note     Jyothi Romero  : 1980    Status: Inpatient  Day #: 1    Attending: Kevan Conrad MD  PCP: Tam Bertrand MD      Assessment and Plan     New onset atrial fibrillation with RVR  - converted to NSR  - con --    --    --   >60   CA  9.0   --    --    --   8.1*   ALB  4.2   --    --    --    --    NA  139   --    --    --   139   K  3.3   --    --    --   4.1  4.1   CL  105   --    --    --   110   CO2  24   --    --    --   20*   GLU  129*   --    --    -- -------------------------- Atrial fibrillation -Nonspecific ST depression + Nonspecific T-abnormality -Nondiagnostic.  ABNORMAL When compared with ECG of 08/19/2017 01:18:10 No significant changes have occurred Electronically signed on 08/19/2017 at 11:20 b

## 2017-08-20 NOTE — PLAN OF CARE
Problem: Patient/Family Goals  Goal: Patient/Family Short Term Goal  Patient's Short Term Goal: Decreased/absent nausea and vomiting     Interventions:   - Reglan/Zofran available PRN  - No reports of N/V at this time  - See additional Care Plan goals for

## 2017-08-20 NOTE — PROGRESS NOTES
[facility]  Progress Note    Jyothi Romero Patient Status:  Inpatient    1980 MRN [id number]   Location [facility] 3W/SW Attending James Mcnulty MD   [facility] Day # 1 PCP Tam Bertrand MD     Subjective:  N/V resolved.   Converted to NS Can stop heparin. CHADS2 score 0, Rx aspirin for stoke prophylaxis. OK for discharge home today from cardiac standpoint. Follow up Dr Fernandez Medina or Dr Nadja Romeo in office after discharge. Thanks.     Amna Rudolph  8/20/2017  4:39 PM

## 2017-08-20 NOTE — PLAN OF CARE
CARDIOVASCULAR - ADULT    • Maintains optimal cardiac output and hemodynamic stability Progressing    • Absence of cardiac arrhythmias or at baseline Progressing        Patient/Family Goals    • Patient/Family Long Term Goal: Remain out of hospital  Progre

## 2017-08-21 ENCOUNTER — TELEPHONE (OUTPATIENT)
Dept: INTERNAL MEDICINE UNIT | Facility: HOSPITAL | Age: 37
End: 2017-08-21

## 2017-08-21 NOTE — TELEPHONE ENCOUNTER
Pt discharged from [facility] on 8/20/17 . Please call to schedule follow up with Primary Care Physician.    Thanks

## 2017-08-22 ENCOUNTER — TELEPHONE (OUTPATIENT)
Dept: FAMILY MEDICINE CLINIC | Facility: CLINIC | Age: 37
End: 2017-08-22

## 2017-08-22 NOTE — TELEPHONE ENCOUNTER
Patient was in the ER  - wants to know if there's any way she can follow up with you today regarding the issue (this is about her heart) or some time tomorrow. Please advise.

## 2017-08-23 ENCOUNTER — OFFICE VISIT (OUTPATIENT)
Dept: FAMILY MEDICINE CLINIC | Facility: CLINIC | Age: 37
End: 2017-08-23

## 2017-08-23 VITALS
SYSTOLIC BLOOD PRESSURE: 131 MMHG | WEIGHT: 179.19 LBS | TEMPERATURE: 98 F | BODY MASS INDEX: 30 KG/M2 | DIASTOLIC BLOOD PRESSURE: 86 MMHG | RESPIRATION RATE: 14 BRPM | HEART RATE: 90 BPM

## 2017-08-23 DIAGNOSIS — I48.91 ATRIAL FIBRILLATION, UNSPECIFIED TYPE (HCC): Primary | ICD-10-CM

## 2017-08-23 PROCEDURE — 99212 OFFICE O/P EST SF 10 MIN: CPT | Performed by: FAMILY MEDICINE

## 2017-08-23 PROCEDURE — 99214 OFFICE O/P EST MOD 30 MIN: CPT | Performed by: FAMILY MEDICINE

## 2017-08-23 NOTE — PROGRESS NOTES
HPI:    Dariana Marquez is a 40year old female presents to clinic for hospital follow up. Last Friday, patient drinks 1 small bottle of sangria and woke up in the middle of the night vomiting and with abdominal pain.  She went to the ER and was told sh Disp: 90 tablet Rfl: 1   Metoprolol Succinate ER 50 MG Oral Tablet 24 Hr TAKE 1 TABLET (50 MG TOTAL) BY MOUTH DAILY.  Disp: 90 tablet Rfl: 1       Allergies:    Amlodipine                  Comment:Other reaction(s): Nausea  Amoxicillin                 Comme more than 12.5 minutes of counseling. Orders This Visit:  No orders of the defined types were placed in this encounter.       Meds This Visit:    No prescriptions requested or ordered in this encounter    Imaging & Referrals:  CARDIO - INTERNAL

## 2017-09-07 ENCOUNTER — PRIOR ORIGINAL RECORDS (OUTPATIENT)
Dept: OTHER | Age: 37
End: 2017-09-07

## 2017-09-07 ENCOUNTER — MYAURORA ACCOUNT LINK (OUTPATIENT)
Dept: OTHER | Age: 37
End: 2017-09-07

## 2017-09-13 ENCOUNTER — HOSPITAL ENCOUNTER (OUTPATIENT)
Dept: CV DIAGNOSTICS | Facility: HOSPITAL | Age: 37
Discharge: HOME | End: 2017-09-13
Attending: INTERNAL MEDICINE
Payer: COMMERCIAL

## 2017-09-13 DIAGNOSIS — I48.0 PAF (PAROXYSMAL ATRIAL FIBRILLATION) (HCC): ICD-10-CM

## 2017-09-13 DIAGNOSIS — I10 HTN (HYPERTENSION): ICD-10-CM

## 2017-09-13 PROCEDURE — 93017 CV STRESS TEST TRACING ONLY: CPT | Performed by: INTERNAL MEDICINE

## 2017-09-13 PROCEDURE — 93350 STRESS TTE ONLY: CPT | Performed by: INTERNAL MEDICINE

## 2017-09-13 PROCEDURE — 93016 CV STRESS TEST SUPVJ ONLY: CPT | Performed by: INTERNAL MEDICINE

## 2017-09-13 PROCEDURE — 93018 CV STRESS TEST I&R ONLY: CPT | Performed by: INTERNAL MEDICINE

## 2017-09-13 NOTE — IMAGING NOTE
Patient here for outpatient stress echo, ordered by Dr. Key Lopez. Resting ECG with T wave inversion inferolateral leads. Patient asymptomatic. I reviewed resting ECG with Dr. Key Lopez. Orders received to proceed with test as planned.    Nonah Gottron RN

## 2017-12-06 ENCOUNTER — OFFICE VISIT (OUTPATIENT)
Dept: FAMILY MEDICINE CLINIC | Facility: CLINIC | Age: 37
End: 2017-12-06

## 2017-12-06 VITALS
SYSTOLIC BLOOD PRESSURE: 151 MMHG | DIASTOLIC BLOOD PRESSURE: 101 MMHG | RESPIRATION RATE: 14 BRPM | HEART RATE: 74 BPM | TEMPERATURE: 98 F

## 2017-12-06 DIAGNOSIS — F41.9 ANXIETY: ICD-10-CM

## 2017-12-06 DIAGNOSIS — I10 ESSENTIAL HYPERTENSION: ICD-10-CM

## 2017-12-06 DIAGNOSIS — N92.0 MENSTRUAL SPOTTING: Primary | ICD-10-CM

## 2017-12-06 PROCEDURE — 99214 OFFICE O/P EST MOD 30 MIN: CPT | Performed by: FAMILY MEDICINE

## 2017-12-06 PROCEDURE — 99212 OFFICE O/P EST SF 10 MIN: CPT | Performed by: FAMILY MEDICINE

## 2017-12-06 RX ORDER — CITALOPRAM 10 MG/1
10 TABLET ORAL DAILY
Qty: 30 TABLET | Refills: 0 | Status: SHIPPED | OUTPATIENT
Start: 2017-12-06 | End: 2018-05-07

## 2017-12-11 NOTE — PROGRESS NOTES
HPI:    Consuelo Ricepshire is a 40year old female presents to clinic for follow up regarding IUD. States that cramps have improved and flow has improved, much lighter.  However, patient feels that periods are longer, states she bleeds for 3-4 days and the Yes              Comment: occasionally       Medications (Active prior to today's visit):    Current Outpatient Prescriptions:  Citalopram Hydrobromide 10 MG Oral Tab Take 1 tablet (10 mg total) by mouth daily.  Disp: 30 tablet Rfl: 0   Lisinopril-Hydrochlo effect    Essential hypertension  - BP elevated, asymptomatic  - daily compliance with meds strongly encouraged. To set a reminder on her phone to remember to take meds.    - to follow up in 2 weeks     Anxiety  - Citalopram 10 mg to pharmacy   - to follow

## 2018-02-15 RX ORDER — METOPROLOL SUCCINATE 50 MG/1
TABLET, EXTENDED RELEASE ORAL
Qty: 90 TABLET | Refills: 1 | Status: SHIPPED | OUTPATIENT
Start: 2018-02-15 | End: 2018-10-13

## 2018-02-15 RX ORDER — LISINOPRIL AND HYDROCHLOROTHIAZIDE 25; 20 MG/1; MG/1
TABLET ORAL
Qty: 90 TABLET | Refills: 1 | Status: SHIPPED | OUTPATIENT
Start: 2018-02-15 | End: 2018-10-13

## 2018-05-07 ENCOUNTER — OFFICE VISIT (OUTPATIENT)
Dept: FAMILY MEDICINE CLINIC | Facility: CLINIC | Age: 38
End: 2018-05-07

## 2018-05-07 VITALS
RESPIRATION RATE: 20 BRPM | TEMPERATURE: 98 F | HEIGHT: 65 IN | HEART RATE: 85 BPM | WEIGHT: 178 LBS | DIASTOLIC BLOOD PRESSURE: 86 MMHG | SYSTOLIC BLOOD PRESSURE: 120 MMHG | BODY MASS INDEX: 29.66 KG/M2

## 2018-05-07 DIAGNOSIS — J06.9 ACUTE URI: ICD-10-CM

## 2018-05-07 PROCEDURE — 99213 OFFICE O/P EST LOW 20 MIN: CPT | Performed by: FAMILY MEDICINE

## 2018-05-07 PROCEDURE — 99212 OFFICE O/P EST SF 10 MIN: CPT | Performed by: FAMILY MEDICINE

## 2018-05-07 RX ORDER — AZITHROMYCIN 250 MG/1
TABLET, FILM COATED ORAL
Qty: 6 TABLET | Refills: 0 | Status: SHIPPED | OUTPATIENT
Start: 2018-05-07 | End: 2018-09-04

## 2018-05-07 NOTE — PROGRESS NOTES
HPI:    Patient ID: Trevor Lynch is a 45year old female. Pt presents with allergy /cold symptoms for several days. Pt has had cough, sore throat. Had fevers. Pt has tried otc remedies without relief. Pt states no sick contacts.            Review of worse or not better; Follow up in one week if not resolved or as needed if worse. Note for work provided        No orders of the defined types were placed in this encounter.       Meds This Visit:  Signed Prescriptions Disp Refills    azithromycin (Henry Kwon

## 2018-05-10 ENCOUNTER — TELEPHONE (OUTPATIENT)
Dept: FAMILY MEDICINE CLINIC | Facility: CLINIC | Age: 38
End: 2018-05-10

## 2018-08-28 ENCOUNTER — TELEPHONE (OUTPATIENT)
Dept: ADMINISTRATIVE | Age: 38
End: 2018-08-28

## 2018-08-28 NOTE — TELEPHONE ENCOUNTER
POONAM packet emailed to pt ('[email or web address]', '[email or web address]') per pc from pt. FMLA form received from [address] [facility] via fax. Logged for processing. Email sent to pt - current appt is needed, pt last seen in December.  NK

## 2018-09-04 ENCOUNTER — LAB ENCOUNTER (OUTPATIENT)
Dept: LAB | Age: 38
End: 2018-09-04
Attending: FAMILY MEDICINE
Payer: COMMERCIAL

## 2018-09-04 ENCOUNTER — OFFICE VISIT (OUTPATIENT)
Dept: FAMILY MEDICINE CLINIC | Facility: CLINIC | Age: 38
End: 2018-09-04
Payer: COMMERCIAL

## 2018-09-04 VITALS
DIASTOLIC BLOOD PRESSURE: 119 MMHG | HEIGHT: 65 IN | BODY MASS INDEX: 30.55 KG/M2 | WEIGHT: 183.38 LBS | SYSTOLIC BLOOD PRESSURE: 154 MMHG | TEMPERATURE: 99 F | HEART RATE: 80 BPM

## 2018-09-04 DIAGNOSIS — I10 ESSENTIAL HYPERTENSION: ICD-10-CM

## 2018-09-04 DIAGNOSIS — N94.6 DYSMENORRHEA: ICD-10-CM

## 2018-09-04 DIAGNOSIS — N92.0 MENORRHAGIA WITH REGULAR CYCLE: ICD-10-CM

## 2018-09-04 DIAGNOSIS — I10 ESSENTIAL HYPERTENSION: Primary | ICD-10-CM

## 2018-09-04 DIAGNOSIS — F41.9 ANXIETY: ICD-10-CM

## 2018-09-04 LAB
ALBUMIN SERPL BCP-MCNC: 3.8 G/DL (ref 3.5–4.8)
ALBUMIN/GLOB SERPL: 1.1 {RATIO} (ref 1–2)
ALP SERPL-CCNC: 53 U/L (ref 32–100)
ALT SERPL-CCNC: 15 U/L (ref 14–54)
ANION GAP SERPL CALC-SCNC: 6 MMOL/L (ref 0–18)
AST SERPL-CCNC: 21 U/L (ref 15–41)
BASOPHILS # BLD: 0 K/UL (ref 0–0.2)
BASOPHILS NFR BLD: 0 %
BILIRUB SERPL-MCNC: 0.2 MG/DL (ref 0.3–1.2)
BUN SERPL-MCNC: 12 MG/DL (ref 8–20)
BUN/CREAT SERPL: 12.2 (ref 10–20)
CALCIUM SERPL-MCNC: 8.9 MG/DL (ref 8.5–10.5)
CHLORIDE SERPL-SCNC: 106 MMOL/L (ref 95–110)
CHOLEST SERPL-MCNC: 133 MG/DL (ref 110–200)
CO2 SERPL-SCNC: 27 MMOL/L (ref 22–32)
CREAT SERPL-MCNC: 0.98 MG/DL (ref 0.5–1.5)
EOSINOPHIL # BLD: 0.1 K/UL (ref 0–0.7)
EOSINOPHIL NFR BLD: 1 %
ERYTHROCYTE [DISTWIDTH] IN BLOOD BY AUTOMATED COUNT: 14.9 % (ref 11–15)
GLOBULIN PLAS-MCNC: 3.5 G/DL (ref 2.5–3.7)
GLUCOSE SERPL-MCNC: 91 MG/DL (ref 70–99)
HCT VFR BLD AUTO: 40.9 % (ref 35–48)
HDLC SERPL-MCNC: 32 MG/DL
HGB BLD-MCNC: 12.8 G/DL (ref 12–16)
LDLC SERPL CALC-MCNC: 78 MG/DL (ref 0–99)
LYMPHOCYTES # BLD: 2.6 K/UL (ref 1–4)
LYMPHOCYTES NFR BLD: 46 %
MCH RBC QN AUTO: 22.1 PG (ref 27–32)
MCHC RBC AUTO-ENTMCNC: 31.3 G/DL (ref 32–37)
MCV RBC AUTO: 70.8 FL (ref 80–100)
MONOCYTES # BLD: 0.4 K/UL (ref 0–1)
MONOCYTES NFR BLD: 8 %
NEUTROPHILS # BLD AUTO: 2.6 K/UL (ref 1.8–7.7)
NEUTROPHILS NFR BLD: 46 %
NONHDLC SERPL-MCNC: 101 MG/DL
OSMOLALITY UR CALC.SUM OF ELEC: 287 MOSM/KG (ref 275–295)
PATIENT FASTING: NO
PLATELET # BLD AUTO: 245 K/UL (ref 140–400)
PMV BLD AUTO: 9.4 FL (ref 7.4–10.3)
POTASSIUM SERPL-SCNC: 3.2 MMOL/L (ref 3.3–5.1)
PROT SERPL-MCNC: 7.3 G/DL (ref 5.9–8.4)
RBC # BLD AUTO: 5.78 M/UL (ref 3.7–5.4)
SODIUM SERPL-SCNC: 139 MMOL/L (ref 136–144)
TRIGL SERPL-MCNC: 117 MG/DL (ref 1–149)
TSH SERPL-ACNC: 2.23 UIU/ML (ref 0.45–5.33)
WBC # BLD AUTO: 5.7 K/UL (ref 4–11)

## 2018-09-04 PROCEDURE — 99212 OFFICE O/P EST SF 10 MIN: CPT | Performed by: FAMILY MEDICINE

## 2018-09-04 PROCEDURE — 99214 OFFICE O/P EST MOD 30 MIN: CPT | Performed by: FAMILY MEDICINE

## 2018-09-04 PROCEDURE — 80053 COMPREHEN METABOLIC PANEL: CPT

## 2018-09-04 PROCEDURE — 36415 COLL VENOUS BLD VENIPUNCTURE: CPT

## 2018-09-04 PROCEDURE — 85025 COMPLETE CBC W/AUTO DIFF WBC: CPT

## 2018-09-04 PROCEDURE — 80061 LIPID PANEL: CPT

## 2018-09-04 PROCEDURE — 84443 ASSAY THYROID STIM HORMONE: CPT

## 2018-09-04 NOTE — PROGRESS NOTES
HPI:    Katheryn Snyder is a 45year old female presents to clinic for follow up regarding HTN. Notes that she is typically compliant with meds but over the long weekend, she kept forgetting to take med.  Is unsure if last dose was on Friday or Saturday visit):    Current Outpatient Prescriptions:  LISINOPRIL-HYDROCHLOROTHIAZIDE 20-25 MG Oral Tab TAKE 1 TABLET BY MOUTH EVERY DAY Disp: 90 tablet Rfl: 1   METOPROLOL SUCCINATE ER 50 MG Oral Tablet 24 Hr TAKE 1 TABLET BY MOUTH EVERY DAY Disp: 90 tablet Rfl: 1 to POONAM     Menorrhagia with regular cycle  - CBC to lab. Patient verbalized understanding of information discussed. No barriers to learning observed.            Orders This Visit:    Orders Placed This Encounter      CBC W Differential W Platelet [E]

## 2018-09-05 ENCOUNTER — TELEPHONE (OUTPATIENT)
Dept: FAMILY MEDICINE CLINIC | Facility: CLINIC | Age: 38
End: 2018-09-05

## 2018-09-05 NOTE — TELEPHONE ENCOUNTER
Called patient to discuss results. No answer so a VM was left. When she calls back, please inform patient of normal CBC. TSH, and Lipid Panel.  K+ is slightly low, let's have her increase dietary potassium - bananas, avocado, spinach, beans, etc. Will rechec

## 2018-09-05 NOTE — TELEPHONE ENCOUNTER
Dr. Adriana Gerard,     Covering intermittent FMLA 1 to 2 episodes per mo for up to 48 hrs per episode. Please sign off. Please sign off on form:  -Highlight the patient and hit \"Chart\" button. -In Chart Review, w/in the Encounter tab - click 1 time on the Telephone call encounter for 8-28-18. Scroll down the telephone encounter.  -Click \"scan on\" blue Hyperlink under \"Media\" heading for PPD Dr. Oakes Dry 9-5-18  w/in the telephone enc.  -Click on Acknowledge button at the bottom right corner and left-click onto image, signature stamp appears and drag signature to Provider signature line. Stamp will turn blue. Close window.     Thank you,  Gonsalo

## 2018-10-13 ENCOUNTER — OFFICE VISIT (OUTPATIENT)
Dept: FAMILY MEDICINE CLINIC | Facility: CLINIC | Age: 38
End: 2018-10-13
Payer: COMMERCIAL

## 2018-10-13 ENCOUNTER — APPOINTMENT (OUTPATIENT)
Dept: LAB | Age: 38
End: 2018-10-13
Attending: FAMILY MEDICINE
Payer: COMMERCIAL

## 2018-10-13 VITALS
WEIGHT: 185.81 LBS | TEMPERATURE: 97 F | HEART RATE: 64 BPM | DIASTOLIC BLOOD PRESSURE: 90 MMHG | HEIGHT: 65 IN | SYSTOLIC BLOOD PRESSURE: 132 MMHG | BODY MASS INDEX: 30.96 KG/M2

## 2018-10-13 DIAGNOSIS — E87.6 HYPOKALEMIA: ICD-10-CM

## 2018-10-13 DIAGNOSIS — I10 ESSENTIAL HYPERTENSION: Primary | ICD-10-CM

## 2018-10-13 PROCEDURE — 80048 BASIC METABOLIC PNL TOTAL CA: CPT

## 2018-10-13 PROCEDURE — 36415 COLL VENOUS BLD VENIPUNCTURE: CPT

## 2018-10-13 PROCEDURE — 99214 OFFICE O/P EST MOD 30 MIN: CPT | Performed by: FAMILY MEDICINE

## 2018-10-13 PROCEDURE — 99212 OFFICE O/P EST SF 10 MIN: CPT | Performed by: FAMILY MEDICINE

## 2018-10-13 RX ORDER — LISINOPRIL AND HYDROCHLOROTHIAZIDE 25; 20 MG/1; MG/1
1 TABLET ORAL
Qty: 90 TABLET | Refills: 1 | Status: SHIPPED | OUTPATIENT
Start: 2018-10-13 | End: 2019-05-14

## 2018-10-13 RX ORDER — METOPROLOL SUCCINATE 50 MG/1
50 TABLET, EXTENDED RELEASE ORAL
Qty: 90 TABLET | Refills: 1 | Status: SHIPPED | OUTPATIENT
Start: 2018-10-13 | End: 2018-11-12 | Stop reason: ALTCHOICE

## 2018-10-15 ENCOUNTER — TELEPHONE (OUTPATIENT)
Dept: OTHER | Age: 38
End: 2018-10-15

## 2018-10-15 NOTE — PROGRESS NOTES
HPI:    Jyothi Romero is a 45year old female presents to clinic for follow up regarding HTN. Notes compliance with meds, denies side effects. Has been working on her diet and exercising more frequently.  Patient denies HAs, blurry vision, nausea, vom Cuff Size: adult   Pulse: 64   Temp: 97 °F (36.1 °C)   TempSrc: Oral   Weight: 185 lb 12.8 oz (84.3 kg)   Height: 5' 5\" (1.651 m)     Physical Exam   Constitutional: She is well-developed, well-nourished, and in no distress.    HENT:   Head: Normocephali

## 2018-10-15 NOTE — TELEPHONE ENCOUNTER
Spoke with patient (identified name and ), results reviewed and agrees with plan. Notes recorded by Marcus West MD on 10/14/2018 at 7:45 PM CDT  Please inform patient of normal BMP, including a normal potassium level.  - Thanks

## 2018-11-10 ENCOUNTER — TELEPHONE (OUTPATIENT)
Dept: FAMILY MEDICINE CLINIC | Facility: CLINIC | Age: 38
End: 2018-11-10

## 2018-11-10 NOTE — TELEPHONE ENCOUNTER
Per [facility], pt is currently paying $15 for Metoprolol. She is requesting Atenolol 25mg tab as a lower out-of-pocket cost, $2. Pls let [facility] know if the change can be made.

## 2018-11-12 RX ORDER — ATENOLOL 25 MG/1
25 TABLET ORAL DAILY
Qty: 90 TABLET | Refills: 0 | Status: SHIPPED | OUTPATIENT
Start: 2018-11-12 | End: 2019-03-04

## 2019-02-28 VITALS
SYSTOLIC BLOOD PRESSURE: 112 MMHG | BODY MASS INDEX: 28.82 KG/M2 | OXYGEN SATURATION: 99 % | HEART RATE: 57 BPM | WEIGHT: 173 LBS | HEIGHT: 65 IN | DIASTOLIC BLOOD PRESSURE: 78 MMHG

## 2019-03-04 RX ORDER — ATENOLOL 25 MG/1
TABLET ORAL
Qty: 90 TABLET | Refills: 0 | Status: SHIPPED | OUTPATIENT
Start: 2019-03-04 | End: 2019-06-23

## 2019-03-13 ENCOUNTER — TELEPHONE (OUTPATIENT)
Dept: ADMINISTRATIVE | Age: 39
End: 2019-03-13

## 2019-03-13 NOTE — TELEPHONE ENCOUNTER
[employer] FMLA form for Dr. Lucy Walton received in Forms dept. Logged for processing. POONAM packet emailed to pt [email or web address], also noted pt needs current appt.  NK

## 2019-03-20 NOTE — TELEPHONE ENCOUNTER
Patient contacted, follow-up appointment made with Dr. Lizy Del Toro for Tuesday 3/26/19 for blood pressure check and FMLA re-cert. Patient informed that HIPAA form will have to be completed and there is a $62 fee for re-cert.

## 2019-03-26 ENCOUNTER — OFFICE VISIT (OUTPATIENT)
Dept: FAMILY MEDICINE CLINIC | Facility: CLINIC | Age: 39
End: 2019-03-26
Payer: COMMERCIAL

## 2019-03-26 VITALS
SYSTOLIC BLOOD PRESSURE: 142 MMHG | HEIGHT: 65 IN | BODY MASS INDEX: 31.55 KG/M2 | DIASTOLIC BLOOD PRESSURE: 84 MMHG | HEART RATE: 69 BPM | WEIGHT: 189.38 LBS | TEMPERATURE: 98 F

## 2019-03-26 DIAGNOSIS — I10 ESSENTIAL HYPERTENSION: Primary | ICD-10-CM

## 2019-03-26 DIAGNOSIS — J30.2 SEASONAL ALLERGIC RHINITIS, UNSPECIFIED TRIGGER: ICD-10-CM

## 2019-03-26 DIAGNOSIS — N94.6 DYSMENORRHEA: ICD-10-CM

## 2019-03-26 PROCEDURE — 99212 OFFICE O/P EST SF 10 MIN: CPT | Performed by: FAMILY MEDICINE

## 2019-03-26 PROCEDURE — 99214 OFFICE O/P EST MOD 30 MIN: CPT | Performed by: FAMILY MEDICINE

## 2019-03-26 RX ORDER — IBUPROFEN 600 MG/1
600 TABLET ORAL
COMMUNITY
Start: 2010-06-04

## 2019-03-26 RX ORDER — FLUTICASONE PROPIONATE 50 MCG
2 SPRAY, SUSPENSION (ML) NASAL DAILY
Qty: 1 BOTTLE | Refills: 0 | Status: SHIPPED | OUTPATIENT
Start: 2019-03-26 | End: 2020-03-20

## 2019-03-26 NOTE — TELEPHONE ENCOUNTER
Patient had an appointment today with Dr. Lucy Walton, HIPAA form completed and $15 fee paid for the re-cert, signed HIPAA and FCR emailed to [email or web address].

## 2019-03-27 NOTE — TELEPHONE ENCOUNTER
Dr. Kathi Sanchez,    Please sign off on form:  -Highlight the patient and hit \"Chart\" button. -In Chart Review, w/in the Encounter tab - click 1 time on the Telephone call encounter for 3/13/19.  Scroll down the telephone encounter.  -Click \"scan on\" blue

## 2019-03-27 NOTE — TELEPHONE ENCOUNTER
Completed and signed FMLA re-cert forms faxed to [address] [place] [address], fax# [phone], fax confirmation received, copies mailed to patient.

## 2019-03-29 NOTE — PROGRESS NOTES
HPI:    Su Marion is a 45year old female presents to clinic for follow-up. HTN -notes compliance with medication, denies side effects.   Notes that lately, her diet has not been the best. Patient denies HAs, blurry vision, nausea, vomiting, CP, Lisinopril-Hydrochlorothiazide 20-25 MG Oral Tab Take 1 tablet by mouth once daily.  Disp: 90 tablet Rfl: 1       Allergies:    Amlodipine              UNKNOWN    Comment:Other reaction(s): Nausea  Amoxicillin                 Comment:Other reaction(s): Trupti Fox

## 2019-05-14 RX ORDER — LISINOPRIL AND HYDROCHLOROTHIAZIDE 25; 20 MG/1; MG/1
TABLET ORAL
Qty: 90 TABLET | Refills: 0 | Status: SHIPPED | OUTPATIENT
Start: 2019-05-14 | End: 2019-09-04

## 2019-06-23 RX ORDER — ATENOLOL 25 MG/1
TABLET ORAL
Qty: 90 TABLET | Refills: 1 | Status: SHIPPED | OUTPATIENT
Start: 2019-06-23 | End: 2020-01-20

## 2019-09-03 ENCOUNTER — TELEPHONE (OUTPATIENT)
Dept: FAMILY MEDICINE CLINIC | Facility: CLINIC | Age: 39
End: 2019-09-03

## 2019-09-03 NOTE — TELEPHONE ENCOUNTER
[place] recert recvd in Forms dept via fax. Pt has valid HIPAA on file. Will need to bill $25. Please notify pt. Logged for processing.

## 2019-09-04 RX ORDER — LISINOPRIL AND HYDROCHLOROTHIAZIDE 25; 20 MG/1; MG/1
TABLET ORAL
Qty: 90 TABLET | Refills: 1 | Status: SHIPPED | OUTPATIENT
Start: 2019-09-04 | End: 2020-04-07

## 2019-09-05 NOTE — TELEPHONE ENCOUNTER
Refill passed per [facility], [facility] protocol.   Hypertensive Medications  Protocol Criteria:  · Appointment scheduled in the past 6 months or in the next 3 months  · BMP or CMP in the past 12 months  · Creatinine result < 2  Recent Outpatient Visits

## 2019-09-05 NOTE — TELEPHONE ENCOUNTER
Dr. Rivers Ready    Please sign off on form:  -Highlight the patient and hit \"Chart\" button. -In Chart Review, w/in the Encounter tab - click 1 time on the Telephone call encounter for 9/3/19.  Scroll down the telephone encounter.  -Click \"scan on\" blue Hy

## 2019-09-06 NOTE — TELEPHONE ENCOUNTER
FMLA forms faxxed to [address] @ [phone], confirm rcvd. Copy mailed to pt.

## 2019-09-20 ENCOUNTER — OFFICE VISIT (OUTPATIENT)
Dept: FAMILY MEDICINE CLINIC | Facility: CLINIC | Age: 39
End: 2019-09-20
Payer: COMMERCIAL

## 2019-09-20 VITALS
DIASTOLIC BLOOD PRESSURE: 95 MMHG | WEIGHT: 176 LBS | SYSTOLIC BLOOD PRESSURE: 133 MMHG | HEART RATE: 86 BPM | HEIGHT: 65 IN | BODY MASS INDEX: 29.32 KG/M2 | TEMPERATURE: 98 F

## 2019-09-20 DIAGNOSIS — H61.23 BILATERAL IMPACTED CERUMEN: ICD-10-CM

## 2019-09-20 DIAGNOSIS — I10 ESSENTIAL HYPERTENSION: ICD-10-CM

## 2019-09-20 DIAGNOSIS — J30.1 SEASONAL ALLERGIC RHINITIS DUE TO POLLEN: Primary | ICD-10-CM

## 2019-09-20 PROCEDURE — 99214 OFFICE O/P EST MOD 30 MIN: CPT | Performed by: NURSE PRACTITIONER

## 2019-09-20 RX ORDER — ALBUTEROL SULFATE 90 UG/1
2 AEROSOL, METERED RESPIRATORY (INHALATION) EVERY 4 HOURS PRN
Qty: 18 G | Refills: 5 | Status: SHIPPED | OUTPATIENT
Start: 2019-09-20 | End: 2021-12-04

## 2019-09-20 NOTE — PROGRESS NOTES
HPI  Pt here for runny nose, cough; felt chilled and then hot last night. Took otc cold medications and it seems to have helped     Was on a trip to [facility] and returned home 3 days ago.      Review of Systems   Constitutional: Positive for activity araiza History   Problem Relation Age of Onset   • Hypertension Father    • Hypertension Mother    • Genito-Urinary Disorder Mother         fibroids   • Diabetes Maternal Grandmother        Social History    Socioeconomic History      Marital status: Single Weight Concern: Not Asked        Special Diet: Not Asked        Back Care: Not Asked        Exercise: Not Asked        Bike Helmet: Not Asked        Seat Belt: Not Asked        Self-Exams: Not Asked    Social History Narrative      Not on file        Curre She has no rales. She exhibits no tenderness. Abdominal: Soft. Bowel sounds are normal. She exhibits no distension. There is no tenderness. Musculoskeletal: Normal range of motion. She exhibits no tenderness.    Lymphadenopathy:     She has no cervical

## 2019-11-06 ENCOUNTER — OFFICE VISIT (OUTPATIENT)
Dept: PODIATRY CLINIC | Facility: CLINIC | Age: 39
End: 2019-11-06
Payer: COMMERCIAL

## 2019-11-06 DIAGNOSIS — M76.62 ACHILLES TENDINITIS OF LEFT LOWER EXTREMITY: Primary | ICD-10-CM

## 2019-11-06 PROCEDURE — L3060 FOOT ARCH SUPP LONGITUD/META: HCPCS | Performed by: PODIATRIST

## 2019-11-06 PROCEDURE — 99243 OFF/OP CNSLTJ NEW/EST LOW 30: CPT | Performed by: PODIATRIST

## 2019-11-06 NOTE — PROGRESS NOTES
HPI:    Patient ID: Vickie Gregorio is a 44year old female. This 70-year-old female presents as a new patient to me on consult from [address] Patient's chief complaint is left heel pain. The pain is been present for 4 or 5 days.   Patient states sh dispensed an insole with 1/4 inch heel lift on the left side. I encouraged shoes with support at all times no barefoot walking. I also instructed her on the use of ibuprofen 600 mg 3 times per day with meals for the next 10 to 14 days.   I cautioned her a

## 2019-12-28 ENCOUNTER — APPOINTMENT (OUTPATIENT)
Dept: GENERAL RADIOLOGY | Age: 39
End: 2019-12-28
Attending: EMERGENCY MEDICINE
Payer: COMMERCIAL

## 2019-12-28 ENCOUNTER — HOSPITAL ENCOUNTER (OUTPATIENT)
Age: 39
Discharge: HOME | End: 2019-12-28
Attending: EMERGENCY MEDICINE
Payer: COMMERCIAL

## 2019-12-28 ENCOUNTER — NURSE TRIAGE (OUTPATIENT)
Dept: OTHER | Age: 39
End: 2019-12-28

## 2019-12-28 VITALS
SYSTOLIC BLOOD PRESSURE: 150 MMHG | OXYGEN SATURATION: 100 % | DIASTOLIC BLOOD PRESSURE: 95 MMHG | RESPIRATION RATE: 18 BRPM | TEMPERATURE: 98 F | HEART RATE: 65 BPM

## 2019-12-28 DIAGNOSIS — M25.551 RIGHT HIP PAIN: Primary | ICD-10-CM

## 2019-12-28 PROCEDURE — 73502 X-RAY EXAM HIP UNI 2-3 VIEWS: CPT | Performed by: EMERGENCY MEDICINE

## 2019-12-28 PROCEDURE — 99214 OFFICE O/P EST MOD 30 MIN: CPT

## 2019-12-28 PROCEDURE — 99213 OFFICE O/P EST LOW 20 MIN: CPT

## 2019-12-28 RX ORDER — HYDROCODONE BITARTRATE AND ACETAMINOPHEN 5; 325 MG/1; MG/1
1 TABLET ORAL ONCE
Status: COMPLETED | OUTPATIENT
Start: 2019-12-28 | End: 2019-12-28

## 2019-12-28 RX ORDER — METHYLPREDNISOLONE 4 MG/1
TABLET ORAL
Qty: 1 PACKAGE | Refills: 0 | Status: SHIPPED | OUTPATIENT
Start: 2019-12-28 | End: 2020-01-07 | Stop reason: ALTCHOICE

## 2019-12-28 RX ORDER — CYCLOBENZAPRINE HCL 10 MG
10 TABLET ORAL 3 TIMES DAILY PRN
Qty: 20 TABLET | Refills: 0 | Status: SHIPPED | OUTPATIENT
Start: 2019-12-28 | End: 2020-01-04

## 2019-12-28 RX ORDER — HYDROCODONE BITARTRATE AND ACETAMINOPHEN 5; 325 MG/1; MG/1
1-2 TABLET ORAL EVERY 6 HOURS PRN
Qty: 10 TABLET | Refills: 0 | Status: SHIPPED | OUTPATIENT
Start: 2019-12-28 | End: 2020-01-04

## 2019-12-28 NOTE — ED INITIAL ASSESSMENT (HPI)
Pt here with complaints of right hip pain that started yesterday , pt denies any trauma or injury to hip area , pt states hip pain is radiating to right knee area, pt states pain gets worse with ambulation

## 2019-12-28 NOTE — ED PROVIDER NOTES
[address]: [address]      History   Patient presents with:  Musculoskeletal Problem    Stated Complaint: RT  HIP/ BACK PAIN    HPI    She presents to the immediate care center complaining of several weeks of hip a Current:BP (!) 150/95   Pulse 65   Temp 98.3 °F (36.8 °C) (Oral)   Resp 18   SpO2 100%         Physical Exam  Vitals signs and nursing note reviewed. Constitutional:       General: She is not in acute distress.      Appearance: She is well-developed Radiology exams  Viewed and reviewed by myself and findings discussed with patient including need for follow up    I suspect this is either arthritic exacerbation or possibly lumbar radiculopathy.   Should be placed on pain medication, Medrol Dosepak

## 2019-12-28 NOTE — TELEPHONE ENCOUNTER
Action Requested: Summary for Provider     []  Critical Lab, Recommendations Needed  [] Need Additional Advice  [x]   FYI    []   Need Orders  [] Need Medications Sent to Pharmacy  []  Other     SUMMARY:  Patient has had on and off right hip pain for a whi

## 2019-12-30 NOTE — TELEPHONE ENCOUNTER
Discharge         Home or Self Care         ED/IC AVS (Printed 12/28/2019)         Follow-Ups: Schedule an appointment with Walter Urena MD (Family Medicine) in 3 days (12/31/2019)

## 2019-12-31 NOTE — TELEPHONE ENCOUNTER
Future Appointments       Provider Department Appt Notes    In 1 week Real Warren MD [facility], [facility], [address], [facility] f/u

## 2020-01-07 ENCOUNTER — TELEPHONE (OUTPATIENT)
Dept: FAMILY MEDICINE CLINIC | Facility: CLINIC | Age: 40
End: 2020-01-07

## 2020-01-07 ENCOUNTER — OFFICE VISIT (OUTPATIENT)
Dept: FAMILY MEDICINE CLINIC | Facility: CLINIC | Age: 40
End: 2020-01-07
Payer: COMMERCIAL

## 2020-01-07 VITALS
WEIGHT: 180 LBS | DIASTOLIC BLOOD PRESSURE: 86 MMHG | SYSTOLIC BLOOD PRESSURE: 124 MMHG | HEART RATE: 69 BPM | BODY MASS INDEX: 29.99 KG/M2 | TEMPERATURE: 98 F | RESPIRATION RATE: 15 BRPM | HEIGHT: 65 IN

## 2020-01-07 DIAGNOSIS — M25.551 RIGHT HIP PAIN: Primary | ICD-10-CM

## 2020-01-07 DIAGNOSIS — I10 ESSENTIAL HYPERTENSION: ICD-10-CM

## 2020-01-07 PROCEDURE — 99214 OFFICE O/P EST MOD 30 MIN: CPT | Performed by: FAMILY MEDICINE

## 2020-01-07 NOTE — TELEPHONE ENCOUNTER
Jossy Castaneda had an appointment today with Dr. Humberto Gray. She filled out a HIPAA form and FCR but did not have the forms with her at the appointment, they will be faxed over. HIPAA form and FCR faxed to [email or web address], original left in the [facility] office. Will fax over the forms once received.

## 2020-01-07 NOTE — PROGRESS NOTES
HPI:    Ileana Elizondo is a 44year old female presents to clinic for urgent care follow-up. 3 weeks back, patient tripped and fell getting out of the train.   Reports that her right hip and right lower extremity were in a lot of pain, felt very stiff ATENOLOL 25 MG Oral Tab TAKE 1 TABLET BY MOUTH EVERY DAY 90 tablet 1   • ibuprofen 600 MG Oral Tab Take 600 mg by mouth. • Fluticasone Propionate 50 MCG/ACT Nasal Suspension 2 sprays by Each Nare route daily.  (Patient not taking: Reported on 1/7/2020 ) 1/7/2020  Raj Walls MD

## 2020-01-08 ENCOUNTER — TELEPHONE (OUTPATIENT)
Dept: OTHER | Age: 40
End: 2020-01-08

## 2020-01-08 ENCOUNTER — TELEPHONE (OUTPATIENT)
Dept: FAMILY MEDICINE CLINIC | Facility: CLINIC | Age: 40
End: 2020-01-08

## 2020-01-08 NOTE — TELEPHONE ENCOUNTER
Paola Reddy calling for update on patient condition for right hip pain for short term disability  No authorization for release of information noted  Will fax POONAM form to [facility].

## 2020-01-08 NOTE — TELEPHONE ENCOUNTER
A request for additional information was received via fax from [address] [place] [address], They received a claim for disability benefits for patient but they need additional documentation.  Request emailed to [email or web address].

## 2020-01-09 NOTE — TELEPHONE ENCOUNTER
Additional paperwork from [facility] was faxed to [employer]. Paperwork was scanned over.

## 2020-01-11 NOTE — TELEPHONE ENCOUNTER
Additional paperwork came through fax at [facility] office.  Scanned and placed in bin

## 2020-01-13 NOTE — TELEPHONE ENCOUNTER
Dr. Leroy Ward,    Patient is asking for intermittent FMLA 1- 3 days a month with episodes lasting 1-3 days for arthritis in her hip. Do you approve?     Thank Minta Boxer

## 2020-01-15 NOTE — TELEPHONE ENCOUNTER
Dr. Rukhsana Gross    Please sign off on form:  -Highlight the patient and hit \"Chart\" button. -In Chart Review, w/in the Encounter tab - click 1 time on the Telephone call encounter for 1/8/20.  Scroll down the telephone encounter.  -Click \"scan on\" blue Hy

## 2020-01-15 NOTE — TELEPHONE ENCOUNTER
Patient calling states physical therapy has told her she needs to come 3 days a week, intermittent leave will be needed for therapy. Therapy will be three times a week for six weeks.  Patient wants to make sure she will be covered for the days she has physi

## 2020-01-16 ENCOUNTER — TELEPHONE (OUTPATIENT)
Dept: OTHER | Age: 40
End: 2020-01-16

## 2020-01-16 NOTE — TELEPHONE ENCOUNTER
Mary from [facility] calling and requesting PT referral/order. Can fax at [phone]. Informed that there is an order but still waiting of the approval.    Managed Care=PT referral still \"open\", please check for the authorization.   TRIAGE SU[employer]OR

## 2020-01-16 NOTE — TELEPHONE ENCOUNTER
Marisa Ferreira forms faxed to [address] - [phone]. Mailed copies to pt. Left message to pt and to have [facility] forms faxed to Forms dept: [phone].

## 2020-01-16 NOTE — TELEPHONE ENCOUNTER
Dr. Ila Burton for intermittent leave and PT appts for hip pain    Please sign off on form:  -Highlight the patient and hit \"Chart\" button. -In Chart Review, w/in the Encounter tab - click 1 time on the Telephone call encounter for 1/8/20.  Scroll

## 2020-01-17 NOTE — TELEPHONE ENCOUNTER
Order faxed to Artemio Nowak.    Managed care please update on status of referral below, thank you.     Referral Notes   Number of Notes: 1   Type Date User Summary Attachment   Provider Comments 01/07/2020 11:06 AM Violeta Nava MD Provider Chris Wesley

## 2020-01-17 NOTE — TELEPHONE ENCOUNTER
Faxed [facility] to [facility] source - [phone]. Mailed copy to pt and notified pt.

## 2020-01-17 NOTE — TELEPHONE ENCOUNTER
[facility] ([place], [place])Athletic checking status on order for patient and states can be faxed to [phone].

## 2020-01-20 RX ORDER — ATENOLOL 25 MG/1
TABLET ORAL
Qty: 90 TABLET | Refills: 1 | Status: SHIPPED | OUTPATIENT
Start: 2020-01-20 | End: 2020-09-01

## 2020-01-20 NOTE — TELEPHONE ENCOUNTER
Review pended refill request as it does not fall under a protocol.   Requested Prescriptions     Pending Prescriptions Disp Refills   • ATENOLOL 25 MG Oral Tab [Pharmacy Med Name: ATENOLOL 25 MG TABLET] 30 tablet 2     Sig: TAKE 1 TABLET BY MOUTH EVERY DAY

## 2020-01-22 NOTE — TELEPHONE ENCOUNTER
Revised FMLA and faxed to [address] [place] [address] - [phone].  Mailed copy to pt and notified pt

## 2020-01-24 NOTE — TELEPHONE ENCOUNTER
FMLA form faxed to Forms dept on 1/23/20.  Left message to pt to see if we can disregard form or if has a new request.

## 2020-04-07 RX ORDER — LISINOPRIL AND HYDROCHLOROTHIAZIDE 25; 20 MG/1; MG/1
TABLET ORAL
Qty: 30 TABLET | Refills: 5 | Status: SHIPPED | OUTPATIENT
Start: 2020-04-07 | End: 2020-05-26

## 2020-05-26 RX ORDER — LISINOPRIL AND HYDROCHLOROTHIAZIDE 25; 20 MG/1; MG/1
TABLET ORAL
Qty: 30 TABLET | Refills: 5 | Status: SHIPPED | OUTPATIENT
Start: 2020-05-26 | End: 2020-12-23

## 2020-09-01 RX ORDER — ATENOLOL 25 MG/1
TABLET ORAL
Qty: 30 TABLET | Refills: 5 | Status: SHIPPED | OUTPATIENT
Start: 2020-09-01 | End: 2021-04-09

## 2020-12-23 RX ORDER — LISINOPRIL AND HYDROCHLOROTHIAZIDE 25; 20 MG/1; MG/1
TABLET ORAL
Qty: 30 TABLET | Refills: 5 | Status: SHIPPED | OUTPATIENT
Start: 2020-12-23 | End: 2021-07-23

## 2021-04-09 RX ORDER — ATENOLOL 25 MG/1
TABLET ORAL
Qty: 30 TABLET | Refills: 5 | Status: SHIPPED | OUTPATIENT
Start: 2021-04-09 | End: 2021-11-16

## 2021-07-23 RX ORDER — LISINOPRIL AND HYDROCHLOROTHIAZIDE 25; 20 MG/1; MG/1
TABLET ORAL
Qty: 30 TABLET | Refills: 5 | Status: SHIPPED | OUTPATIENT
Start: 2021-07-23

## 2021-11-16 RX ORDER — ATENOLOL 25 MG/1
TABLET ORAL
Qty: 30 TABLET | Refills: 0 | Status: SHIPPED | OUTPATIENT
Start: 2021-11-16 | End: 2021-12-23

## 2021-11-19 NOTE — TELEPHONE ENCOUNTER
After my concerns for release today about the nursing facility and my team has touched base with social work with, with the following plan.  I will call this management tomorrow to further discuss my concerns and appropriate next step of action.  Attempt was made to talk to family today however it went voice mail.  I will call the care facility that will be accepting Casandra Lovett and talk with administration there. I will raise my concerns that are documented about her prior care and see if they can reassure me that these things will not happen in the future and that Casandra would be better taken care of if she were to return there.  I will call her son tomorrow and discuss this with him.    Going forward if the care facility is reasonable placed for Casandra to return after readdressed the issues, Dr. Portillo and/or myself will be seeing her on a weekly basis.  She has now had 3 repeated admissions to the hospital with wound issues, need for further surgery, need for further antibiotics, need for further work-up/intervention.  She needs close monitoring by physicians that know her best and this will require weekly visits.    aStish Booker MD  Orthopedic Surgery, Foot & Ankle  [facility], [facility] Location     Patient returned our call   Made f/u appt     Future Appointments   Date Time Provider Miriam Castellanosi   11/29/2021  2:45 PM Shahrzad Esparza MD [facility]     Informed mask is required for building entry and appointment. May have one support person at the visit if needed. Patient verbalizes understanding and agrees.

## 2021-12-04 ENCOUNTER — OFFICE VISIT (OUTPATIENT)
Dept: FAMILY MEDICINE CLINIC | Facility: CLINIC | Age: 41
End: 2021-12-04
Payer: COMMERCIAL

## 2021-12-04 VITALS
TEMPERATURE: 98 F | DIASTOLIC BLOOD PRESSURE: 87 MMHG | HEART RATE: 73 BPM | WEIGHT: 198 LBS | BODY MASS INDEX: 33 KG/M2 | RESPIRATION RATE: 16 BRPM | SYSTOLIC BLOOD PRESSURE: 128 MMHG

## 2021-12-04 DIAGNOSIS — Z12.31 BREAST CANCER SCREENING BY MAMMOGRAM: ICD-10-CM

## 2021-12-04 DIAGNOSIS — N94.6 DYSMENORRHEA: ICD-10-CM

## 2021-12-04 DIAGNOSIS — J30.1 SEASONAL ALLERGIC RHINITIS DUE TO POLLEN: ICD-10-CM

## 2021-12-04 DIAGNOSIS — I10 ESSENTIAL HYPERTENSION: Primary | ICD-10-CM

## 2021-12-04 PROCEDURE — 3079F DIAST BP 80-89 MM HG: CPT | Performed by: FAMILY MEDICINE

## 2021-12-04 PROCEDURE — 99214 OFFICE O/P EST MOD 30 MIN: CPT | Performed by: FAMILY MEDICINE

## 2021-12-04 PROCEDURE — 3074F SYST BP LT 130 MM HG: CPT | Performed by: FAMILY MEDICINE

## 2021-12-04 RX ORDER — ALBUTEROL SULFATE 90 UG/1
2 AEROSOL, METERED RESPIRATORY (INHALATION) EVERY 4 HOURS PRN
Qty: 18 G | Refills: 5 | Status: SHIPPED | OUTPATIENT
Start: 2021-12-04

## 2021-12-04 RX ORDER — LORATADINE 10 MG/1
10 TABLET ORAL DAILY
COMMUNITY

## 2021-12-04 NOTE — PROGRESS NOTES
HPI:    Rachana Baptiste is a 39year old female presents to clinic for follow-up regarding hypertension. Reports compliance with medication, denies side effects. Tries to limit salt in her diet.  Patient denies HAs, blurry vision, nausea, vomiting, CP tablet 5   • ibuprofen 600 MG Oral Tab Take 600 mg by mouth.          Allergies:    Amlodipine              UNKNOWN    Comment:Other reaction(s): Nausea  Amoxicillin                 Comment:Other reaction(s): Diarrhea      Depression Screening (PHQ-2/PHQ-9) 108 (90 Base) MCG/ACT Inhalation Aero Soln 18 g 5     Sig: Inhale 2 puffs into the lungs every 4 (four) hours as needed for Wheezing or Shortness of Breath.        Imaging & Referrals:  [facility] VÍCTOR 2D+3D SCREENING BILAT (CPT=77067/49109)       This note was cr

## 2021-12-23 RX ORDER — ATENOLOL 25 MG/1
TABLET ORAL
Qty: 90 TABLET | Refills: 0 | Status: SHIPPED | OUTPATIENT
Start: 2021-12-23 | End: 2022-03-23

## 2022-01-08 ENCOUNTER — HOSPITAL ENCOUNTER (OUTPATIENT)
Dept: MAMMOGRAPHY | Age: 42
Discharge: HOME | End: 2022-01-08
Attending: FAMILY MEDICINE
Payer: COMMERCIAL

## 2022-01-08 DIAGNOSIS — Z12.31 BREAST CANCER SCREENING BY MAMMOGRAM: ICD-10-CM

## 2022-01-08 PROCEDURE — 77067 SCR MAMMO BI INCL CAD: CPT | Performed by: FAMILY MEDICINE

## 2022-01-08 PROCEDURE — 77063 BREAST TOMOSYNTHESIS BI: CPT | Performed by: FAMILY MEDICINE

## 2022-01-10 LAB — AMB EXT COVID-19 RESULT: DETECTED

## 2022-01-14 ENCOUNTER — TELEPHONE (OUTPATIENT)
Dept: FAMILY MEDICINE CLINIC | Facility: CLINIC | Age: 42
End: 2022-01-14

## 2022-01-14 DIAGNOSIS — Z20.822 COVID-19 RULED OUT BY LABORATORY TESTING: Primary | ICD-10-CM

## 2022-01-14 NOTE — TELEPHONE ENCOUNTER
Patient states she needs a covid test to return to work. Initial test done at outside facility. Had a mild sore throat, felt feverish Sunday, 1/9/2022. Covid test done 1/10/22. Symptoms have resolved.      Patient is not vaccinated with covid vaccin

## 2022-01-20 ENCOUNTER — NURSE ONLY (OUTPATIENT)
Dept: LAB | Age: 42
End: 2022-01-20
Attending: FAMILY MEDICINE
Payer: COMMERCIAL

## 2022-01-20 ENCOUNTER — TELEPHONE (OUTPATIENT)
Dept: FAMILY MEDICINE CLINIC | Facility: CLINIC | Age: 42
End: 2022-01-20

## 2022-01-20 DIAGNOSIS — Z20.822 COVID-19 RULED OUT BY LABORATORY TESTING: ICD-10-CM

## 2022-01-20 NOTE — TELEPHONE ENCOUNTER
Pt. States that she was positive for covid-19 last week Monday, and that it has been over 5 day since she was positive and feels fine now, so pt. Would like to go back to work, but will need to get a note from , that states that she is able to return. Please send note to My Chart.

## 2022-01-21 LAB — SARS-COV-2 RNA RESP QL NAA+PROBE: NOT DETECTED

## 2022-02-24 RX ORDER — LISINOPRIL AND HYDROCHLOROTHIAZIDE 25; 20 MG/1; MG/1
1 TABLET ORAL DAILY
Qty: 90 TABLET | Refills: 0 | Status: SHIPPED | OUTPATIENT
Start: 2022-02-24

## 2022-02-24 NOTE — TELEPHONE ENCOUNTER
Please review; protocol failed.  Or has no protocol    Requested Prescriptions   Pending Prescriptions Disp Refills    LISINOPRIL-HYDROCHLOROTHIAZIDE 20-25 MG Oral Tab [Pharmacy Med Name: LISINOPRIL-HCTZ 20-25 MG TAB] 30 tablet 3     Sig: TAKE 1 TABLET BY MOUTH EVERY DAY        Hypertensive Medications Protocol Failed - 2/23/2022  3:01 AM        Failed - CMP or BMP in past 12 months        Passed - Appointment in past 6 or next 3 months        Passed - GFR  > 50     Lab Results   Component Value Date    GFRAA >60 10/13/2018                        Recent Outpatient Visits              1 month ago COVID-19 ruled out by laboratory testing    [address]    Nurse Only    2 months ago Essential hypertension    [facility], [facility], [address], [address] [place] Ballesteros MD    Office Visit    2 years ago Right hip pain    [facility], [facility], [address], [address] [place] Ballesteros MD    Office Visit    2 years ago Achilles tendinitis of left lower extremity    [facility], [address], [place], Durel Sandhoff, DPM    Office Visit    2 years ago Seasonal allergic rhinitis due to pollen    [facility], [address], MICHEL Mcdonnell    Office Visit

## 2022-03-23 RX ORDER — ATENOLOL 25 MG/1
25 TABLET ORAL DAILY
Qty: 90 TABLET | Refills: 0 | Status: SHIPPED | OUTPATIENT
Start: 2022-03-23 | End: 2022-07-18

## 2022-03-23 NOTE — TELEPHONE ENCOUNTER
Please review refill protocol failed/ no protocol  Requested Prescriptions   Pending Prescriptions Disp Refills    ATENOLOL 25 MG Oral Tab [Pharmacy Med Name: ATENOLOL 25 MG TABLET] 30 tablet 2     Sig: TAKE 1 TABLET BY MOUTH EVERY DAY        There is no refill protocol information for this order

## 2022-06-01 RX ORDER — LISINOPRIL AND HYDROCHLOROTHIAZIDE 25; 20 MG/1; MG/1
TABLET ORAL
Qty: 30 TABLET | Refills: 2 | Status: SHIPPED | OUTPATIENT
Start: 2022-06-01

## 2022-07-18 RX ORDER — ATENOLOL 25 MG/1
25 TABLET ORAL DAILY
Qty: 90 TABLET | Refills: 1 | Status: SHIPPED | OUTPATIENT
Start: 2022-07-18 | End: 2023-01-16

## 2022-09-20 RX ORDER — LISINOPRIL AND HYDROCHLOROTHIAZIDE 25; 20 MG/1; MG/1
TABLET ORAL
Qty: 30 TABLET | Refills: 2 | Status: SHIPPED | OUTPATIENT
Start: 2022-09-20

## 2022-12-14 RX ORDER — LISINOPRIL AND HYDROCHLOROTHIAZIDE 25; 20 MG/1; MG/1
TABLET ORAL
Qty: 30 TABLET | Refills: 2 | Status: SHIPPED | OUTPATIENT
Start: 2022-12-14

## 2024-03-28 RX ORDER — ATENOLOL 25 MG/1
25 TABLET ORAL DAILY
Qty: 90 TABLET | Refills: 1 | OUTPATIENT
Start: 2024-03-28

## 2024-03-28 RX ORDER — ATENOLOL 25 MG/1
25 TABLET ORAL DAILY
Qty: 90 TABLET | Refills: 3 | OUTPATIENT
Start: 2024-03-28

## 2024-09-25 ENCOUNTER — TELEPHONE (OUTPATIENT)
Dept: FAMILY MEDICINE CLINIC | Facility: CLINIC | Age: 44
End: 2024-09-25

## 2024-10-01 RX ORDER — ATENOLOL 25 MG/1
25 TABLET ORAL DAILY
Qty: 90 TABLET | Refills: 1 | OUTPATIENT
Start: 2024-10-01

## 2024-11-04 ENCOUNTER — OFFICE VISIT (OUTPATIENT)
Dept: FAMILY MEDICINE CLINIC | Facility: CLINIC | Age: 44
End: 2024-11-04

## 2024-11-04 VITALS — DIASTOLIC BLOOD PRESSURE: 87 MMHG | WEIGHT: 200 LBS | SYSTOLIC BLOOD PRESSURE: 137 MMHG | BODY MASS INDEX: 33 KG/M2

## 2024-11-04 DIAGNOSIS — Z12.31 ENCOUNTER FOR SCREENING MAMMOGRAM FOR MALIGNANT NEOPLASM OF BREAST: ICD-10-CM

## 2024-11-04 DIAGNOSIS — N94.6 DYSMENORRHEA: ICD-10-CM

## 2024-11-04 DIAGNOSIS — Z30.9 ENCOUNTER FOR CONTRACEPTIVE MANAGEMENT, UNSPECIFIED TYPE: ICD-10-CM

## 2024-11-04 DIAGNOSIS — I10 ESSENTIAL HYPERTENSION: Primary | ICD-10-CM

## 2024-11-04 DIAGNOSIS — D64.9 ANEMIA, UNSPECIFIED TYPE: ICD-10-CM

## 2024-11-04 RX ORDER — ALBUTEROL SULFATE 90 UG/1
2 INHALANT RESPIRATORY (INHALATION) EVERY 4 HOURS PRN
Qty: 18 G | Refills: 5 | Status: SHIPPED | OUTPATIENT
Start: 2024-11-04

## 2024-11-04 RX ORDER — LISINOPRIL AND HYDROCHLOROTHIAZIDE 20; 25 MG/1; MG/1
1 TABLET ORAL DAILY
Qty: 90 TABLET | Refills: 3 | Status: SHIPPED | OUTPATIENT
Start: 2024-11-04

## 2024-11-04 RX ORDER — ATENOLOL 25 MG/1
25 TABLET ORAL DAILY
Qty: 90 TABLET | Refills: 3 | Status: SHIPPED | OUTPATIENT
Start: 2024-11-04

## 2024-11-04 NOTE — PROGRESS NOTES
HPI:    Aura Licea is a 44 year old female presents to clinic for follow up regarding HTN. 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   Takes lisinopril-hydrochlorothiazide daily, no side effects. Tries to limit salt in diet. Patient denies HAs, blurry vision, nausea, vomiting, CP, palpitations, dizziness, SOB, or other symptoms of concern.   Also, needs to discuss IUD removal. States that since she had it placed, she has had minimal cramping, moderate flow.   Recent dx of anemia, gets vitamin b12 injections monthly.         HISTORY:  Past Medical History:    Abnormal uterine bleeding    abnormal bleeding (women's health)    Anemia, iron deficiency    Unspecified essential hypertension    Uterine polyp    uterine polyps.  Hysteroscopy and D&C 2010      Past Surgical History:   Procedure Laterality Date    D & c  2010    Hysteroscopy and D&C. Uterine polyps    Foot/toes surgery proc unlisted Right < 9/2011 ?    right foot surgery, digit removed from baby toe    Other surgical history  2010    uterine polypectomy      Family History   Problem Relation Age of Onset    Hypertension Father     Hypertension Mother     Genito-Urinary Disorder Mother         fibroids    Diabetes Maternal Grandmother       Social History:   Social History     Socioeconomic History    Marital status: Single   Tobacco Use    Smoking status: Former     Current packs/day: 1.00     Types: Cigarettes    Smokeless tobacco: Never   Vaping Use    Vaping status: Never Used   Substance and Sexual Activity    Alcohol use: Yes     Comment: occasionally    Drug use: Yes     Types: Cannabis    Sexual activity: Yes     Partners: Male     Birth control/protection: Condom   Other Topics Concern    Caffeine Concern Yes     Comment: soda, occasionally     Social Drivers of Health      Received from [facility]    Social Connections    Received from [facility]    Housing Stability         Medications (Active prior to today's visit):  Current Outpatient Medications   Medication Sig Dispense Refill    atenolol 25 MG Oral Tab Take 1 tablet (25 mg total) by mouth daily. 90 tablet 3    lisinopril-hydroCHLOROthiazide 20-25 MG Oral Tab Take 1 tablet by mouth daily. 90 tablet 3    albuterol 108 (90 Base) MCG/ACT Inhalation Aero Soln Inhale 2 puffs into the lungs every 4 (four) hours as needed for Wheezing or Shortness of Breath. 18 g 5    loratadine 10 MG Oral Tab Take 1 tablet (10 mg total) by mouth daily.      ibuprofen 600 MG Oral Tab Take 600 mg by mouth. (Patient not taking: Reported on 11/4/2024)         Allergies:  Allergies[1]      Depression Screening (PHQ-2/PHQ-9): Over the LAST 2 WEEKS   Little interest or pleasure in doing things: Not at all    Feeling down, depressed, or hopeless: Not at all    PHQ-2 SCORE: 0           ROS:   Review of Systems    PHYSICAL EXAM:     Vitals:    11/04/24 1001   BP: 137/87   BP Location: Right arm   Patient Position: Sitting   Cuff Size: large   Weight: 200 lb (90.7 kg)     Physical Exam  Vitals reviewed.   Constitutional:       General: She is not in acute distress.  Cardiovascular:      Rate and Rhythm: Normal rate.   Pulmonary:      Effort: Pulmonary effort is normal. No respiratory distress.   Neurological:      Mental Status: She is alert.   Psychiatric:         Mood and Affect: Mood normal.         ASSESSMENT/PLAN:   (I10) Essential hypertension  (primary encounter diagnosis)  Plan:   -Blood pressure at goal, to continue current management.  Continued lifestyle modifications encouraged.  Follow-up in 3 months or sooner if needed.      (N94.6) Dysmenorrhea  (Z30.9) Encounter for contraceptive management, unspecified type  Plan:   - IUD placed in 2017, appt made in 2 weeks for replacement, will plan on pap at the same time.     (Z12.31) Encounter for screening mammogram for malignant neoplasm of breast  Plan: [facility] VÍCTOR 2D+3D SCREENING BILAT          (CPT=77067/24283)       (D64.9) Anemia, unspecified type  Plan:   - Last B12 injection 10/21, return in 1 month.            Responsible party/patient verbalized understanding of information discussed. No barriers to learning observed.            Orders This Visit:  Orders Placed This Encounter   Procedures    TETANUS, DIPHTHERIA TOXOIDS AND ACELLULAR PERTUSIS VACCINE (TDAP), >7 YEARS, IM USE    Fluzone trivalent vaccine, PF 0.5mL, 6mo+ (55745)       Meds This Visit:  Requested Prescriptions     Signed Prescriptions Disp Refills    atenolol 25 MG Oral Tab 90 tablet 3     Sig: Take 1 tablet (25 mg total) by mouth daily.    lisinopril-hydroCHLOROthiazide 20-25 MG Oral Tab 90 tablet 3     Sig: Take 1 tablet by mouth daily.    albuterol 108 (90 Base) MCG/ACT Inhalation Aero Soln 18 g 5     Sig: Inhale 2 puffs into the lungs every 4 (four) hours as needed for Wheezing or Shortness of Breath.       Imaging & Referrals:  TETANUS, DIPHTHERIA TOXOIDS AND ACELLULAR PERTUSIS VACCINE (TDAP), >7 YEARS, IM USE  INFLUENZA VACCINE, TRI, PRESERV FREE, 0.5 ML  [facility] VÍCTOR 2D+3D SCREENING BILAT (CPT=77067/82960)     Chaperone offered at visit today.     The 21st Century cures Act makes medical notes like these available to patients in the interest of transparency.  However, be advised that this is a medical document.  It is intended as peer to peer communication.  It is written in medical language and may contain abbreviations or verbiage that are unfamiliar.  It may appear blunt or direct.  Medical documents are intended to carry relevant information, facts as evident, and the clinical opinion of the practitioner.      This note was created by [employer] voice recognition. Errors in content may be related to improper recognition by the system; efforts to review and correct have been done but errors may still exist. Please contact me with any questions.       11/4/2024  Josh Bentley MD       [1]   Allergies  Allergen Reactions    Amlodipine  UNKNOWN     Other reaction(s): Nausea    Amoxicillin      Other reaction(s): Diarrhea

## 2024-11-20 ENCOUNTER — OFFICE VISIT (OUTPATIENT)
Dept: FAMILY MEDICINE CLINIC | Facility: CLINIC | Age: 44
End: 2024-11-20
Payer: COMMERCIAL

## 2024-11-20 VITALS — SYSTOLIC BLOOD PRESSURE: 139 MMHG | RESPIRATION RATE: 17 BRPM | DIASTOLIC BLOOD PRESSURE: 89 MMHG | HEART RATE: 72 BPM

## 2024-11-20 DIAGNOSIS — Z12.4 PAP SMEAR FOR CERVICAL CANCER SCREENING: ICD-10-CM

## 2024-11-20 DIAGNOSIS — T83.32XA INTRAUTERINE CONTRACEPTIVE DEVICE THREADS LOST, INITIAL ENCOUNTER: Primary | ICD-10-CM

## 2024-11-20 LAB
CONTROL LINE PRESENT WITH A CLEAR BACKGROUND (YES/NO): YES YES/NO
KIT LOT #: 7722 NUMERIC
PREGNANCY TEST, URINE: NEGATIVE

## 2024-11-20 PROCEDURE — 99214 OFFICE O/P EST MOD 30 MIN: CPT | Performed by: FAMILY MEDICINE

## 2024-11-20 PROCEDURE — 3079F DIAST BP 80-89 MM HG: CPT | Performed by: FAMILY MEDICINE

## 2024-11-20 PROCEDURE — 81025 URINE PREGNANCY TEST: CPT | Performed by: FAMILY MEDICINE

## 2024-11-20 PROCEDURE — 3075F SYST BP GE 130 - 139MM HG: CPT | Performed by: FAMILY MEDICINE

## 2024-11-20 NOTE — PROGRESS NOTES
HPI:    Aura Licea is a 44 year old female presents clinic for IUD replacement/removal.    HISTORY:  Past Medical History:    Abnormal uterine bleeding    abnormal bleeding (women's health)    Anemia, iron deficiency    Unspecified essential hypertension    Uterine polyp    uterine polyps.  Hysteroscopy and D&C 2010      Past Surgical History:   Procedure Laterality Date    D & c  2010    Hysteroscopy and D&C. Uterine polyps    Foot/toes surgery proc unlisted Right < 9/2011 ?    right foot surgery, digit removed from baby toe    Other surgical history  2010    uterine polypectomy      Family History   Problem Relation Age of Onset    Hypertension Father     Hypertension Mother     Genito-Urinary Disorder Mother         fibroids    Diabetes Maternal Grandmother       Social History:   Social History     Socioeconomic History    Marital status: Single   Tobacco Use    Smoking status: Former     Current packs/day: 1.00     Types: Cigarettes    Smokeless tobacco: Never   Vaping Use    Vaping status: Never Used   Substance and Sexual Activity    Alcohol use: Yes     Comment: occasionally    Drug use: Yes     Types: Cannabis    Sexual activity: Yes     Partners: Male     Birth control/protection: Condom   Other Topics Concern    Caffeine Concern Yes     Comment: soda, occasionally     Social Drivers of Health      Received from [facility]    Social Connections    Received from [facility]    Housing Stability        Medications (Active prior to today's visit):  Current Outpatient Medications   Medication Sig Dispense Refill    atenolol 25 MG Oral Tab Take 1 tablet (25 mg total) by mouth daily. 90 tablet 3    lisinopril-hydroCHLOROthiazide 20-25 MG Oral Tab Take 1 tablet by mouth daily. 90 tablet 3    albuterol 108 (90 Base) MCG/ACT Inhalation Aero Soln Inhale 2 puffs into the lungs every 4 (four) hours as needed for Wheezing or Shortness of Breath. 18 g 5    loratadine 10 MG  Oral Tab Take 1 tablet (10 mg total) by mouth daily.      ibuprofen 600 MG Oral Tab Take 600 mg by mouth. (Patient not taking: Reported on 11/20/2024)         Allergies:  Allergies[1]         ROS:   Review of Systems    PHYSICAL EXAM:     Vitals:    11/20/24 1332 11/20/24 1334   BP: (!) 156/93 139/89   BP Location: Right arm Right arm   Patient Position: Sitting Sitting   Cuff Size: large adult   Pulse: 72    Resp: 17      Physical Exam  Vitals reviewed.   Cardiovascular:      Rate and Rhythm: Normal rate.   Pulmonary:      Effort: Pulmonary effort is normal. No respiratory distress.   Neurological:      Mental Status: She is alert.       Procedure Note - Patient placed in lithotomy position. Speculum inserted and cervix visualized.  IUD strings not visualized. Positions changed without visualization of string.       ASSESSMENT/PLAN:       ICD-10-CM    1. Intrauterine contraceptive device threads lost, initial encounter  T83.32XA ThinPrep PAP with HPV Reflex Request [E]     US PELVIS W DOPPLER (GKX=53637/65989)     ThinPrep PAP with HPV Reflex Request [E]      2. Pap smear for cervical cancer screening  Z12.4 ThinPrep PAP with HPV Reflex Request [E]     ThinPrep PAP with HPV Reflex Request [E]        -Pap to lab.  -Unable to locate IUD strings.  Pelvic ultrasound ordered.  Will await results.               Responsible party/patient verbalized understanding of information discussed. No barriers to learning observed.            Orders This Visit:  Orders Placed This Encounter   Procedures    POC Urine pregnancy test [18710]    ThinPrep PAP with HPV Reflex Request [E]       Meds This Visit:  Requested Prescriptions      No prescriptions requested or ordered in this encounter       Imaging & Referrals:  US PELVIS W DOPPLER (KTP=20030/30864)     Chaperone offered at visit today.     The 21st Century cures Act makes medical notes like these available to patients in the interest of transparency.  However, be advised that  this is a medical document.  It is intended as peer to peer communication.  It is written in medical language and may contain abbreviations or verbiage that are unfamiliar.  It may appear blunt or direct.  Medical documents are intended to carry relevant information, facts as evident, and the clinical opinion of the practitioner.      This note was created by [employer] voice recognition. Errors in content may be related to improper recognition by the system; efforts to review and correct have been done but errors may still exist. Please contact me with any questions.       11/20/2024  Josh Bentley MD       [1]   Allergies  Allergen Reactions    Amlodipine UNKNOWN     Other reaction(s): Nausea    Amoxicillin      Other reaction(s): Diarrhea

## 2024-11-22 LAB — HPV E6+E7 MRNA CVX QL NAA+PROBE: NEGATIVE

## 2025-01-17 ENCOUNTER — HOSPITAL ENCOUNTER (OUTPATIENT)
Dept: ULTRASOUND IMAGING | Age: 45
Discharge: HOME | End: 2025-01-17
Attending: FAMILY MEDICINE
Payer: COMMERCIAL

## 2025-01-17 DIAGNOSIS — T83.32XA INTRAUTERINE CONTRACEPTIVE DEVICE THREADS LOST, INITIAL ENCOUNTER: ICD-10-CM

## 2025-01-17 PROCEDURE — 76830 TRANSVAGINAL US NON-OB: CPT | Performed by: FAMILY MEDICINE

## 2025-01-17 PROCEDURE — 76856 US EXAM PELVIC COMPLETE: CPT | Performed by: FAMILY MEDICINE

## 2025-01-25 ENCOUNTER — TELEPHONE (OUTPATIENT)
Dept: FAMILY MEDICINE CLINIC | Facility: CLINIC | Age: 45
End: 2025-01-25

## 2025-01-25 ENCOUNTER — HOSPITAL ENCOUNTER (OUTPATIENT)
Age: 45
Discharge: HOME | End: 2025-01-25
Payer: COMMERCIAL

## 2025-01-25 ENCOUNTER — NURSE ONLY (OUTPATIENT)
Dept: FAMILY MEDICINE CLINIC | Facility: CLINIC | Age: 45
End: 2025-01-25

## 2025-01-25 VITALS
OXYGEN SATURATION: 100 % | TEMPERATURE: 98 F | RESPIRATION RATE: 19 BRPM | HEART RATE: 65 BPM | SYSTOLIC BLOOD PRESSURE: 138 MMHG | DIASTOLIC BLOOD PRESSURE: 92 MMHG

## 2025-01-25 DIAGNOSIS — J34.89 STUFFY AND RUNNY NOSE: ICD-10-CM

## 2025-01-25 DIAGNOSIS — E53.8 B12 DEFICIENCY: Primary | ICD-10-CM

## 2025-01-25 DIAGNOSIS — R09.81 NASAL CONGESTION: Primary | ICD-10-CM

## 2025-01-25 DIAGNOSIS — Z11.52 ENCOUNTER FOR SCREENING FOR COVID-19: ICD-10-CM

## 2025-01-25 LAB
POCT INFLUENZA A: NEGATIVE
POCT INFLUENZA B: NEGATIVE
SARS-COV-2 RNA RESP QL NAA+PROBE: NOT DETECTED

## 2025-01-25 PROCEDURE — 96372 THER/PROPH/DIAG INJ SC/IM: CPT | Performed by: FAMILY MEDICINE

## 2025-01-25 RX ORDER — PSEUDOEPHEDRINE HCL 120 MG/1
120 TABLET, FILM COATED, EXTENDED RELEASE ORAL EVERY 12 HOURS PRN
Qty: 10 TABLET | Refills: 0 | Status: SHIPPED | OUTPATIENT
Start: 2025-01-25 | End: 2025-02-24

## 2025-01-25 RX ORDER — CYANOCOBALAMIN 1000 UG/ML
1000 INJECTION, SOLUTION INTRAMUSCULAR; SUBCUTANEOUS ONCE
Status: COMPLETED | OUTPATIENT
Start: 2025-01-25 | End: 2025-01-25

## 2025-01-25 RX ORDER — FLUTICASONE PROPIONATE 50 MCG
2 SPRAY, SUSPENSION (ML) NASAL DAILY
Qty: 16 G | Refills: 0 | Status: SHIPPED | OUTPATIENT
Start: 2025-01-25 | End: 2025-02-24

## 2025-01-25 RX ADMIN — CYANOCOBALAMIN 1000 MCG: 1000 INJECTION, SOLUTION INTRAMUSCULAR; SUBCUTANEOUS at 10:01:00

## 2025-01-25 NOTE — TELEPHONE ENCOUNTER
Patient calling for refill for medication. Patient is out of medication, last dose taken Thursday 1/23/25      lisinopril-hydroCHLOROthiazide 20-25 MG Oral Tab     [id number] - [place], [place] - [address] [place] [address], [phone], [phone]

## 2025-01-25 NOTE — DISCHARGE INSTRUCTIONS
Negative for COVID-19, influenza A, influenza B.  As we discussed, this could very well be the beginning of a viral URI.  It could also be the beginning of a sinus infection.  However, as we discussed sinus infections typically require 7 to 10 days of symptoms with brief period of improvement and double sickening.    Monitor your symptoms and see how they progress/develop.  I have prescribed Flonase and Sudafed to the pharmacy.  You can take as needed.    Recommend sleeping elevated and upright, steam showers for any cough that may develop or for congestion.    Drink plenty of water and get plenty of rest.    If you do not have any improvement in 1 week, please follow-up with your primary care doctor and/or return to immediate care.

## 2025-01-25 NOTE — ED PROVIDER NOTES
Patient Seen in: [facility]      History     Chief Complaint   Patient presents with    Sinus Problem     Stated Complaint: Sinus congestion    Subjective: This is a 44-year-old female, past medical history of hypertension, anemia, presents to immediate care for evaluation of sinus congestion and runny nose that started yesterday evening.  No fever, chills, fatigue, body aches.  No cough or sore throat.  No recent travel.  Positive sick exposures at work.  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  She does not communicate chest pain, dizziness, lightheadedness, palpitations, shortness of breath.  Afebrile on arrival.  Well-appearing.  AOx4.  The history is provided by the patient.             Objective:     No pertinent past medical history.            No pertinent past surgical history.              No pertinent social history.            Review of Systems   Constitutional: Negative.  Negative for activity change, appetite change, chills, fatigue and fever.   HENT:  Positive for congestion, rhinorrhea, sinus pressure, sinus pain and sneezing. Negative for ear discharge, ear pain, postnasal drip and sore throat.    Eyes: Negative.    Respiratory: Negative.  Negative for cough.    Cardiovascular: Negative.    Gastrointestinal: Negative.    Genitourinary: Negative.    Musculoskeletal: Negative.    Skin: Negative.    Neurological: Negative.  Negative for dizziness, weakness, light-headedness and headaches.       Positive for stated complaint: Sinus congestion  Other systems are as noted in HPI.  Constitutional and vital signs reviewed.      All other systems reviewed and negative except as noted above.    Physical Exam     ED Triage Vitals [01/25/25 1026]   BP (!) 138/92   Pulse 65   Resp 19   Temp 97.8 °F (36.6 °C)   Temp src Oral   SpO2 100 %   O2 Device None (Room air)       Current Vitals:   Vital Signs  BP: (!) 138/92  Pulse: 65  Resp: 19  Temp: 97.8 °F (36.6 °C)  Temp src: Oral    Oxygen Therapy  SpO2: 100  %  O2 Device: None (Room air)        Physical Exam  Constitutional:       General: She is not in acute distress.     Appearance: Normal appearance. She is not ill-appearing or toxic-appearing.   HENT:      Head: Normocephalic.      Right Ear: There is impacted cerumen.      Left Ear: There is impacted cerumen.      Nose: Congestion present.      Mouth/Throat:      Mouth: Mucous membranes are moist.      Pharynx: Oropharynx is clear. No oropharyngeal exudate or posterior oropharyngeal erythema.   Eyes:      General:         Right eye: No discharge.         Left eye: No discharge.      Extraocular Movements: Extraocular movements intact.      Pupils: Pupils are equal, round, and reactive to light.   Cardiovascular:      Rate and Rhythm: Normal rate and regular rhythm.      Pulses: Normal pulses.      Heart sounds: Normal heart sounds.   Pulmonary:      Effort: Pulmonary effort is normal. No respiratory distress.      Breath sounds: Normal breath sounds. No stridor. No wheezing, rhonchi or rales.   Chest:      Chest wall: No tenderness.   Musculoskeletal:         General: Normal range of motion.      Cervical back: Normal range of motion and neck supple.   Skin:     General: Skin is warm.      Capillary Refill: Capillary refill takes less than 2 seconds.   Neurological:      General: No focal deficit present.      Mental Status: She is alert and oriented to person, place, and time.             ED Course     Labs Reviewed   RAPID SARS-COV-2 BY PCR - Normal   POCT FLU TEST - Normal    Narrative:     This assay is a rapid molecular in vitro test utilizing nucleic acid amplification of influenza A and B viral RNA.                   MDM      Differentials considered include: COVID-19, influenza A, influenza B, viral URI.    Patient with less than 24 hours of symptoms.  Did discuss we can test for COVID-19 and influenza.    Patient is aware that it is way too early to suggest sinusitis.  She is aware typical length and  duration of acute sinusitis with brief period of improvement and then double sickening.  She is also aware of common complaints of acute sinusitis.  She is not communicating ocular pain, headache, facial pain, facial swelling, dental pain, ear pain.    Her lungs are clear on examination.  No adventitious breath sounds.  No evidence of pneumonia or bronchitis.    Patient is negative for COVID-19.  Negative for influenza A and influenza B.    Discussed with patient early onset symptoms.  She is aware to monitor her symptoms and how they develop.  Educated patient on supportive and symptomatic management at home.    She is aware Flonase and Sudafed sent to the pharmacy.    She is aware to drink plenty water and get plenty of rest.  Sleep elevated and upright.  Sleep with humidifier.  Steam showers for cough and congestion.    She is aware to follow-up with primary care doctor in about 1 week if she does not feel any better.    She is aware of signs symptoms of sinus infection that warrant reevaluation.        Medical Decision Making      Disposition and Plan     Clinical Impression:  1. Nasal congestion    2. Encounter for screening for COVID-19    3. Stuffy and runny nose         Disposition:  Discharge  1/25/2025 10:53 am    Follow-up:  Josh Bentley MD  [address] [place] [address]  [phone]    In 7 days  If symptoms worsen          Medications Prescribed:  Discharge Medication List as of 1/25/2025 10:59 AM        START taking these medications    Details   fluticasone propionate 50 MCG/ACT Nasal Suspension 2 sprays by Nasal route daily., Normal, Disp-16 g, R-0      PSEUDOEPHEDRINE  MG Oral Tablet 12 Hr Take 1 tablet (120 mg total) by mouth every 12 (twelve) hours as needed for congestion., Normal, Disp-10 tablet, R-0, MORAIMA                 Supplementary Documentation:

## 2025-01-27 NOTE — TELEPHONE ENCOUNTER
Lisinopril-hydrochlorothiazide 20-25m year supply sent to [facility] in [place] on 2024

## (undated) NOTE — LETTER
AUTHORIZATION FOR SURGICAL OPERATION OR OTHER PROCEDURE    1. I hereby authorize Dr. Migue Bentley, and [facility] staff assigned to my case to perform the following operation and/or procedure at the [facility] site:    _______________________________________________________________________________________________      _______________________________________________________________________________________________    2.  My physician has explained the nature and purpose of the operation or other procedure, possible alternative methods of treatment, the risks involved, and the possibility of complication to me.  I acknowledge that no guarantee has been made as to the result that may be obtained.  3.  I recognize that, during the course of this operation, or other procedure, unforseen conditions may necessitate additional or different procedure than those listed above.  I, therefore, further authorize and request that the above named physician, his/her physician assistants or designees perform such procedures as are, in his/her professional opinion, necessary and desirable.  4.  Any tissue or organs removed in the operation or other procedure may be disposed of by and at the discretion of the [facility] and [facility].  5.  I understand that in the event of a medical emergency, I will be transported by local paramedics to [facility] or other hospital emergency department.  6.  I certify that I have read and fully understand the above consent to operation and/or other procedure.    7.  I acknowledge that my physician has explained sedation/analgesia administration to me including the risks and benefits.  I consent to the administration of sedation/analgesia as may be necessary or desirable in the judgement of my physician.    Witness signature: ___________________________________________________ Date:  ______/______/_____                    Time:   ________ A.M.  P.M.       Patient Name:  ______________________________________________________  (please print)      Patient signature:  ___________________________________________________             Relationship to Patient:           []  Parent    Responsible person                          []  Spouse  In case of minor or                    [] Other  _____________   Incompetent name:  __________________________________________________                               (please print)      _____________      Responsible person  In case of minor or  Incompetent signature:  _______________________________________________    Statement of Physician  My signature below affirms that prior to the time of the procedure, I have explained to the patient and/or his/her guardian, the risks and benefits involved in the proposed treatment and any reasonable alternative to the proposed treatment.  I have also explained the risks and benefits involved in the refusal of the proposed treatment and have answered the patient's questions.                        Date:  ______/______/_______  Provider                      Signature:  __________________________________________________________       Time:  ___________ A.M    P.M.

## (undated) NOTE — ED AVS SNAPSHOT
[facility] Emergency Department    [address]     [address] [place] [address]    Phone:  [phone]    Fax:  [phone]           Neida Chen   MRN: [id number]    Department:  [facility] Emergency Department   Date of Visit:  5/ and Class Registration line at [phone] or find a doctor online by visiting [email or web address].    IF THERE IS ANY CHANGE OR WORSENING OF YOUR CONDITION, CALL YOUR PRIMARY CARE PHYSICIAN AT ONCE OR RETURN IMMEDIATELY TO [address] [place] [address].     If

## (undated) NOTE — ED AVS SNAPSHOT
[facility] Emergency Department    [address] [address]    Phone:  [phone]    Fax:  [phone]           Dedra Feliciano   MRN: [id number]    Department:  [facility] Emergency Department   Date of Visit:  5/ Nitrofurantoin Macrocrystal 100 MG Caps   Quantity:  10 capsule   Commonly known as:  MACRODANTIN   Take 1 capsule (100 mg total) by mouth Q12H.             Where to Get Your Medications      You can get these medications from any pharmacy     Bring a braxton tell this physician (or your personal doctor if your instructions are to return to your personal doctor) about any new or lasting problems.  The primary care or specialist physician may see patients referred from the [facility] Emergency Dep [address] [facility] ([address] [place] [address],[address] Floor) [address] ([address] [facility]) [phone]   Janie [address] [facility] ([place] [address]) [address] [facility] [address] Simone Bhagat  (

## (undated) NOTE — LETTER
Date & Time: 12/30/2019, 9:40 AM  Patient: Nai Lover  Encounter Provider(s):    Kerry Shell MD       To Whom It May Concern:    Dileep Maxwell was seen and treated in our department on 12/28/2019.  She should not return to work until clear

## (undated) NOTE — LETTER
November 6, 2019         Gaby Handy MD  [address] [facility]      Patient: Vickie Gregorio   YOB: 1980   Date of Visit: 11/6/2019       Dear Dr. Lizy Del Toro MD,    I saw your patient, Vickie Gregorio, on 11/6/

## (undated) NOTE — MR AVS SNAPSHOT
[facility]  [address], [address]  [phone]               Thank you for choosing us for your health care visit with Archie Jane MD.  We are glad to serve you and happy to provide you with this summary o - hydrochlorothiazide 25 MG Tabs  - Metoprolol Tartrate 50 MG Tabs  - naproxen 500 MG Tabs            Today's Orders     CBC W Differential W Platelet [E]    Complete by:  Jan 17, 2017 (Approximate)    Assoc Dx:  Essential hypertension [I10]           Com EAT THESE FOODS MORE OFTEN: EAT THESE FOODS LESS OFTEN:   Make half your plate fruits and vegetables Highly refined, white starches including white bread, rice and pasta   Eat plenty of protein, keep the fat content low Sugars:  sodas and sports drinks, ca

## (undated) NOTE — LETTER
1/7/2020          To Whom It May Concern:    John Chakraborty is currently under my medical care and was seen in clinic for a follow up visit. Please excuse her absence from 12/30/19 - 1/8/2020, she is cleared to return on 1/9/2020 with no restrictions.

## (undated) NOTE — LETTER
Patient Name: Hien Lackey  : 1980  MRN: [id number]  Patient Address: [address] [place] [address] is committed to the safety and well-being of our patients, members, Gerardo Arenas therapy. These treatments, when available and appropriate, should be given as soon as possible after diagnosis.       Seek Further Care      If you are awaiting test results or are confirmed positive for COVID-19, and your symptoms worsen at home with sympt doorknobs. Use household cleaning sprays or wipes according to the label instructions. Post-Discharge Follow-up  Please call your primary care provider within two days of your discharge to arrange for a telehealth follow-up.  CDC does not recommend Control & Prevention (CDC)  10 things you can do to manage your health at home, [email or web address]. pdf  [email or web address]

## (undated) NOTE — MR AVS SNAPSHOT
[facility]  [address], [address]  [phone]               Thank you for choosing us for your health care visit with Ney Saunders MD.  We are glad to serve you and happy to provide you with this summary o Medications Administered in the Office Today     Levonorgestrel IUD 20 mcg                    MyChart                  Visit [facility] online at  [email or web address]

## (undated) NOTE — MR AVS SNAPSHOT
[facility]  [address], [address]  [phone]               Thank you for choosing us for your health care visit with Kristie Carreno MD.  We are glad to serve you and happy to provide you with this summary o [address] [facility] [address], [phone], [phone]  [address], [address] [facility]     Phone:  [phone]    - Lisinopril-Hydrochlorothiazide 20-25 MG Tabs            Results of Recent Testing     ELECTROCARDIOGRAM, COMPLETE             MyChart

## (undated) NOTE — MR AVS SNAPSHOT
[facility]  [address]  [phone]               Thank you for choosing us for your health care visit with Sobia Lund MD.  We are glad to serve you and happy to provide you with this summary o Sign up for [employer], your secure online medical record. [employer] will allow you to access patient instructions from your recent visit,  view other health information, and more. To sign up or find more information, go to [email or web address] and cl

## (undated) NOTE — LETTER
5/7/2018          To Whom It May Concern:    Trevor Lynch is currently under my medical care. Please excuse the patient from work missed as she has been ill. May return to work when well.     If you require additional information please contact our

## (undated) NOTE — LETTER
8/23/2017          To Whom It May Concern:    Yulissa Mazariegos is currently under my medical care and was seen in clinic today for a hospital follow up. Patient was admitted in the hospital for an acute medical issue.  Please excuse her absence from 8/18/1

## (undated) NOTE — Clinical Note
1/31/2017          To Whom It May Concern:    Az Phelps is currently under my medical care and was seen in clinic today for an acute medical visit. Please excuse her absence from 1/31/17 - 2/1/17. She is cleared to return on 2/2/17.      If you requ

## (undated) NOTE — LETTER
9/20/2019          To Whom It May Concern:    Rachana Baptiste is currently under my medical care and may not return to work at this time. Please excuse [facility] for 2 days. She may return to work on 9/23/19. Activity is restricted as follows: none.